# Patient Record
Sex: MALE | Employment: OTHER | URBAN - METROPOLITAN AREA
[De-identification: names, ages, dates, MRNs, and addresses within clinical notes are randomized per-mention and may not be internally consistent; named-entity substitution may affect disease eponyms.]

---

## 2017-03-07 ENCOUNTER — HOSPITAL ENCOUNTER (OUTPATIENT)
Dept: LAB | Age: 82
Discharge: HOME | End: 2017-03-07
Payer: MEDICARE

## 2017-03-07 ENCOUNTER — HOSPITAL ENCOUNTER (OUTPATIENT)
Dept: LAB | Age: 82
Discharge: HOME | End: 2017-03-07

## 2017-03-07 LAB
ANION GAP BLD CALC-SCNC: 7 MMOL/L (ref 3–18)
BASOPHILS # BLD AUTO: 0 K/UL (ref 0–0.06)
BASOPHILS # BLD: 1 % (ref 0–2)
BUN SERPL-MCNC: 14 MG/DL (ref 7–18)
BUN/CREAT SERPL: 19 (ref 12–20)
CALCIUM SERPL-MCNC: 8.4 MG/DL (ref 8.5–10.1)
CHLORIDE SERPL-SCNC: 104 MMOL/L (ref 100–108)
CO2 SERPL-SCNC: 26 MMOL/L (ref 21–32)
CREAT SERPL-MCNC: 0.75 MG/DL (ref 0.6–1.3)
DIFFERENTIAL METHOD BLD: ABNORMAL
EOSINOPHIL # BLD: 0.2 K/UL (ref 0–0.4)
EOSINOPHIL NFR BLD: 5 % (ref 0–5)
ERYTHROCYTE [DISTWIDTH] IN BLOOD BY AUTOMATED COUNT: 12.7 % (ref 11.6–14.5)
GLUCOSE SERPL-MCNC: 100 MG/DL (ref 74–99)
HCT VFR BLD AUTO: 37.1 % (ref 36–48)
HGB BLD-MCNC: 12.6 G/DL (ref 13–16)
LYMPHOCYTES # BLD AUTO: 35 % (ref 21–52)
LYMPHOCYTES # BLD: 1.5 K/UL (ref 0.9–3.6)
MCH RBC QN AUTO: 31 PG (ref 24–34)
MCHC RBC AUTO-ENTMCNC: 34 G/DL (ref 31–37)
MCV RBC AUTO: 91.4 FL (ref 74–97)
MONOCYTES # BLD: 0.7 K/UL (ref 0.05–1.2)
MONOCYTES NFR BLD AUTO: 16 % (ref 3–10)
NEUTS SEG # BLD: 1.8 K/UL (ref 1.8–8)
NEUTS SEG NFR BLD AUTO: 43 % (ref 40–73)
PLATELET # BLD AUTO: 137 K/UL (ref 135–420)
PMV BLD AUTO: 11.3 FL (ref 9.2–11.8)
POTASSIUM SERPL-SCNC: 4.4 MMOL/L (ref 3.5–5.5)
RBC # BLD AUTO: 4.06 M/UL (ref 4.7–5.5)
SODIUM SERPL-SCNC: 137 MMOL/L (ref 136–145)
WBC # BLD AUTO: 4.2 K/UL (ref 4.6–13.2)

## 2017-03-07 PROCEDURE — 85025 COMPLETE CBC W/AUTO DIFF WBC: CPT | Performed by: INTERNAL MEDICINE

## 2017-03-07 PROCEDURE — 80048 BASIC METABOLIC PNL TOTAL CA: CPT | Performed by: INTERNAL MEDICINE

## 2018-01-01 ENCOUNTER — DOCUMENTATION ONLY (OUTPATIENT)
Dept: PALLATIVE CARE | Age: 83
End: 2018-01-01

## 2018-01-01 ENCOUNTER — HOSPITAL ENCOUNTER (EMERGENCY)
Age: 83
Discharge: HOME | End: 2018-12-14
Attending: EMERGENCY MEDICINE | Admitting: EMERGENCY MEDICINE
Payer: MEDICARE

## 2018-01-01 VITALS
SYSTOLIC BLOOD PRESSURE: 134 MMHG | OXYGEN SATURATION: 97 % | HEART RATE: 83 BPM | TEMPERATURE: 98.7 F | RESPIRATION RATE: 16 BRPM | DIASTOLIC BLOOD PRESSURE: 72 MMHG
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
## 2018-01-01 DIAGNOSIS — S60.511A ABRASION OF RIGHT HAND, INITIAL ENCOUNTER: Primary | ICD-10-CM

## 2018-01-01 PROCEDURE — 99281 EMR DPT VST MAYX REQ PHY/QHP: CPT

## 2018-05-07 ENCOUNTER — HOSPITAL ENCOUNTER (OUTPATIENT)
Dept: LAB | Age: 83
Discharge: HOME | End: 2018-05-07
Payer: MEDICARE

## 2018-05-07 LAB
ANION GAP SERPL CALC-SCNC: 6 MMOL/L (ref 3–18)
BUN SERPL-MCNC: 17 MG/DL (ref 7–18)
BUN/CREAT SERPL: 23 (ref 12–20)
CALCIUM SERPL-MCNC: 8.2 MG/DL (ref 8.5–10.1)
CHLORIDE SERPL-SCNC: 108 MMOL/L (ref 100–108)
CO2 SERPL-SCNC: 27 MMOL/L (ref 21–32)
CREAT SERPL-MCNC: 0.74 MG/DL (ref 0.6–1.3)
ERYTHROCYTE [DISTWIDTH] IN BLOOD BY AUTOMATED COUNT: 13.2 % (ref 11.6–14.5)
GLUCOSE SERPL-MCNC: 106 MG/DL (ref 74–99)
HCT VFR BLD AUTO: 37.4 % (ref 36–48)
HGB BLD-MCNC: 12.3 G/DL (ref 13–16)
MCH RBC QN AUTO: 30.2 PG (ref 24–34)
MCHC RBC AUTO-ENTMCNC: 32.9 G/DL (ref 31–37)
MCV RBC AUTO: 91.9 FL (ref 74–97)
PLATELET # BLD AUTO: 157 K/UL (ref 135–420)
PMV BLD AUTO: 10.8 FL (ref 9.2–11.8)
POTASSIUM SERPL-SCNC: 4.4 MMOL/L (ref 3.5–5.5)
RBC # BLD AUTO: 4.07 M/UL (ref 4.7–5.5)
SODIUM SERPL-SCNC: 141 MMOL/L (ref 136–145)
WBC # BLD AUTO: 6.5 K/UL (ref 4.6–13.2)

## 2018-05-07 PROCEDURE — 85027 COMPLETE CBC AUTOMATED: CPT | Performed by: INTERNAL MEDICINE

## 2018-05-07 PROCEDURE — 80048 BASIC METABOLIC PNL TOTAL CA: CPT | Performed by: INTERNAL MEDICINE

## 2018-07-09 NOTE — PROGRESS NOTES
[facility]: [phone] ([phone])  [facility]: [address]: [phone]    Patient Name: Tyrone Nuñez  YOB: 1931    Date of Initial Consult: 7/9/2018   Reason for Consult: care decisions  Requesting Provider: Dr. Karthik Montana  Primary Care Physician: June Villa    SUMMARY:   Tyrone Nuñez is a 80y.o. year old with a past history of Parkinson's disease, dementia, BPH, COPD. He is a long term resident at [facility]. Mr. Brock Patterson is bed bound, at times combative due to dementia requires assistance with all of his ADL's. Palliative medicine is consulted for care decisions. PALLIATIVE DIAGNOSES:   1. Advanced care plan discussions  2. Parkinson's disease   3. Dementia   4. Debility        PLAN:   1. Patient seen this am at the nursing care center. In bed, alerts to his name, will follow some simple commands such as open your mouth, he is able to take his medications without issue. His wife later joined at the bedside. Social  to wife Diaz Brewer, one daughter from previous union who is not actively involved in his life. [employer] who receives [employer] disability. Retired from real estate. Was an avid traveler. Has been living at nursing facility for past 2 years. 2. Advanced care plan discussions due to effects of dementia [place] Nails is not able to participate in medical decision making, when he was able he executed an AMD naming his wife Diaz Brewer as MPOA. Goals of care are already established, and wife re affirmed with us today, DNR/DNI. Mrs. Brock Patterson shares Pet[place]key Nails is her \" heart and soul\" but understands clearly his Parkinson's nor Dementia are curable and in fact very debilitating. He is currently very debilitated but stable. We discussed expectations of medical decline in the future, including dysphagia, aspiration pneumonia, sepsis, dehydration.  She would like to continue same medications for now, does not feel pill burden is to great, and if indicated oral antibiotics are acceptable, but no escalation to the hospital for medical decline, no IVF's no IVAB. If he should fall and hospital eval is warranted this is acceptable. Hospice will be appropriate when medical decline occurs. POST form was signed by wife, indicating DNR/DNI, comfort measures, no feeding tube ever. 3. Symptom management currently appears very comfort, wife agrees, discussed use of opioids, sublingual in the event of dysphagia for pain, dyspnea when needed. Will not add at this time as he appears very comfortable. 4. Debility Bed bound, large gentleman who can be combative at times. Requires feeding by wife and staff, incont of bowel and urine. 5. Initial consult note routed to primary continuity provider  6. Communicated plan of care with: Palliative IDT, wife, nurse, attending    GOALS OF CARE:  Patient/Health Care Proxy Stated Goals: Comfort      TREATMENT PREFERENCES:   Code Status: Prior    Advance Care Planning:  Advance Care Planning 11/30/2016   Patient's Healthcare Decision Maker is: Legal Next of Jen Roberts   Primary Decision Maker Name Apollo Gruber   Primary Decision Maker Phone Number [phone]   Primary Decision Maker Relationship to Patient Spouse   Confirm Advance Directive Yes, not on file   Patient Would Like to Complete Advance Directive No       Medical Interventions: Comfort measures   Other Instructions: continue current medications and treatments, do not hospitalize for medical decline  Artificially Administered Nutrition: No feeding tube     Other:  As far as possible, the palliative care team has discussed with patient / health care proxy about goals of care / treatment preferences for patient.      HISTORY:     History obtained from: chart and wife    CHIEF COMPLAINT: dementia and debility     HPI/SUBJECTIVE:    The patient is:   [] Verbal and participatory  [x] Non-participatory due to:   80year old male who is a long term resident at [facility] due to severe functional debility related to dementia and Parkinson's. Currently, bed bound requires assistance with all ADL's, he is able to take and tolerate oral medications. Care decisions discussed with his wife, comfort measures, POST signed. Clinical Pain Assessment (nonverbal scale for nonverbal patients): Clinical Pain Assessment  Severity: 0     Activity (Movement): Lying quietly, normal position    Duration: for how long has pt been experiencing pain (e.g., 2 days, 1 month, years)  Frequency: how often pain is an issue (e.g., several times per day, once every few days, constant)     FUNCTIONAL ASSESSMENT:     Palliative Performance Scale (PPS):  PPS: 30    ECOG  ECOG Status : Completely disabled     PSYCHOSOCIAL/SPIRITUAL SCREENING:      Any spiritual / Quaker concerns: unable to assess for patient, no for wife  [] Yes /  [] No    Caregiver Burnout:  [] Yes /  [x] No /  [] No Caregiver Present      Anticipatory grief assessment:  Unable to assess for patient, no for wife   [] Normal  / [] Maladaptive        REVIEW OF SYSTEMS:     Positive and pertinent negative findings in ROS are noted above in HPI. The following systems were [] reviewed / [x] unable to be reviewed as noted in HPI  Other findings are noted below. Systems: constitutional, ears/nose/mouth/throat, respiratory, gastrointestinal, genitourinary, musculoskeletal, integumentary, neurologic, psychiatric, endocrine. Positive findings noted below. Modified ESAS Completed by: provider   Fatigue: 8 Drowsiness: 4     Pain: 0           Dyspnea: 0     Constipation: No              PHYSICAL EXAM:     Wt Readings from Last 3 Encounters:   12/01/16 85.3 kg (188 lb 1.6 oz)     There were no vitals taken for this visit.   Pain:                        Constitutional: elderly large male who is chronically ill, lying in bed in NAD  ENMT: no nasal discharge, moist mucous membranes  Respiratory: breathing not labored  Skin: warm, dry  Neurologic: opened his eyes to his name, calm followed simple command to open mouth. Did not engage in conversation or answer other questions. HISTORY:     Active Problems:    * No active hospital problems. *    Past Medical History:   Diagnosis Date    Bowel obstruction ([facility]     Cancer (HCC)     colon    Chronic obstructive pulmonary disease ([facility]     Dementia     Hyperlipidemia     Hypertension     Muscle weakness     [facility]       Past Surgical History:   Procedure Laterality Date    HX GI      colon cancer    HX HEENT      left eye cataract      No family history on file. History reviewed, no pertinent family history. Social History   Substance Use Topics    Smoking status: Unknown If Ever Smoked    Smokeless tobacco: Not on file    Alcohol use Not on file     No Known Allergies        LAB AND IMAGING FINDINGS:     Lab Results   Component Value Date/Time    WBC 6.5 05/07/2018 03:00 PM    HGB 12.3 (L) 05/07/2018 03:00 PM    PLATELET 379 28/75/9815 03:00 PM     Lab Results   Component Value Date/Time    Sodium 141 05/07/2018 03:00 PM    Potassium 4.4 05/07/2018 03:00 PM    Chloride 108 05/07/2018 03:00 PM    CO2 27 05/07/2018 03:00 PM    BUN 17 05/07/2018 03:00 PM    Creatinine 0.74 05/07/2018 03:00 PM    Calcium 8.2 (L) 05/07/2018 03:00 PM      Lab Results   Component Value Date/Time    AST (SGOT) 13 (L) 12/07/2016 08:00 AM    Alk.  phosphatase 67 12/07/2016 08:00 AM    Protein, total 6.0 (L) 12/07/2016 08:00 AM    Albumin 2.7 (L) 12/07/2016 08:00 AM    Globulin 3.3 12/07/2016 08:00 AM     No results found for: INR, PTMR, PTP, PT1, PT2, APTT   No results found for: IRON, FE, TIBC, IBCT, PSAT, FERR   No results found for: PH, PCO2, PO2  No components found for: [place]   Lab Results   Component Value Date/Time     11/30/2016 12:40 PM    CK - MB 2.6 11/30/2016 12:40 PM              Total time: 70 minutes   Counseling / coordination time, spent as noted above: 50 minutes    > 50% counseling / coordination: yes with wife     Prolonged service was provided for  []30 min   []75 min in face to face time in the presence of the patient, spent as noted above. Time Start:   Time End:   Note: this can only be billed with 92634 (initial) or 43033 (follow up). If multiple start / stop times, list each separately.

## 2018-08-02 NOTE — PROGRESS NOTES
[facility]: [phone]  [facility]: [address]: [phone]    Patient Name: Linda Alexandre  YOB: 1931    Date of Initial Consult: 7/9/2018   Reason for Consult: care decisions  Requesting Provider: Dr. Elodia Storm  Primary Care Physician: Aida Crews    SUMMARY:   Linda Alexandre is a 80y.o. year old with a past history of Parkinson's disease, dementia, BPH, COPD. He is a long term resident at [facility]. Mr. Opal Raymond is bed bound, at times combative due to dementia requires assistance with all of his ADL's. Palliative medicine is consulted for care decisions. PALLIATIVE DIAGNOSES:   1. Advanced care plan discussions  2. Parkinson's disease   3. Dementia   4. Debility        PLAN:   1. 8/2/2018 Mr. Opal Raymond seen as follow today. His wife was at bedside. She was feeding him breakfast. Drinking OJ, Told us hello but really did not participate in conversation. Wife tells us yesterday there was a period of time he was very difficult to arouse but once aroused seemed back to baseline. Reinforced with wife progression of dementia and likely will wax and wane. There are no changes to goals of care or care decisions. DNr/DNI comfort measures do not escalate to hospital POST on file. 2. Advanced care plan discussions due to effects of dementia Chintan Marrero is not able to participate in medical decision making, when he was able he executed an AMD naming his wife Jose Miguel Villa as MPOA. Goals of care are already established, and wife re affirmed with us today, DNR/DNI. Mrs. Opal Raymond shares Chintan Marrero is her \" heart and soul\" but understands clearly his Parkinson's nor Dementia are curable and in fact very debilitating. He is currently very debilitated but stable. We discussed expectations of medical decline in the future, including dysphagia, aspiration pneumonia, sepsis, dehydration.  She would like to continue same medications for now, does not feel pill burden is to great, and if indicated oral antibiotics are acceptable, but no escalation to the hospital for medical decline, no IVF's no IVAB. If he should fall and hospital eval is warranted this is acceptable. Hospice will be appropriate when medical decline occurs. POST form was signed by wife, indicating DNR/DNI, comfort measures, no feeding tube ever. 3. Symptom management comfortable appearing today tolerating breakfast well. 4. Debility Bed bound, large gentleman who can be combative at times. Requires feeding by wife and staff, incont of bowel and urine. 5. Initial consult note routed to primary continuity provider  6. Communicated plan of care with: Palliative IDT, wife    GOALS OF CARE:  Patient/Health Care Proxy Stated Goals: Comfort      TREATMENT PREFERENCES:   Code Status: Prior    Advance Care Planning:  Advance Care Planning 11/30/2016   Patient's Healthcare Decision Maker is: Legal Next of Jen 69   Primary Decision Maker Name Angus Galaviz   Primary Decision Maker Phone Number [phone]   Primary Decision Maker Relationship to Patient Spouse   Confirm Advance Directive Yes, not on file   Patient Would Like to Complete Advance Directive No       Medical Interventions: Comfort measures   Other Instructions: continue current medications and treatments, do not hospitalize for medical decline  Artificially Administered Nutrition: No feeding tube     Other:  As far as possible, the palliative care team has discussed with patient / health care proxy about goals of care / treatment preferences for patient.      HISTORY:     History obtained from: chart and wife    CHIEF COMPLAINT: dementia and debility     HPI/SUBJECTIVE:    The patient is:   [] Verbal and participatory  [x] Non-participatory due to:   80year old male who is a long term resident at [facility] due to severe functional debility related to dementia and Parkinson's. Currently, bed bound requires assistance with all ADL's, he is able to take and tolerate oral medications. Care decisions discussed with his wife, comfort measures, POST signed. Clinical Pain Assessment (nonverbal scale for nonverbal patients): Clinical Pain Assessment  Severity: 0     Activity (Movement): Lying quietly, normal position    Duration: for how long has pt been experiencing pain (e.g., 2 days, 1 month, years)  Frequency: how often pain is an issue (e.g., several times per day, once every few days, constant)     FUNCTIONAL ASSESSMENT:     Palliative Performance Scale (PPS):  PPS: 30    ECOG  ECOG Status : Completely disabled     PSYCHOSOCIAL/SPIRITUAL SCREENING:      Any spiritual / Advent concerns: unable to assess for patient, no for wife  [] Yes /  [] No    Caregiver Burnout:  [] Yes /  [x] No /  [] No Caregiver Present      Anticipatory grief assessment:  Unable to assess for patient, no for wife   [] Normal  / [] Maladaptive        REVIEW OF SYSTEMS:     Positive and pertinent negative findings in ROS are noted above in HPI. The following systems were [] reviewed / [x] unable to be reviewed as noted in HPI  Other findings are noted below. Systems: constitutional, ears/nose/mouth/throat, respiratory, gastrointestinal, genitourinary, musculoskeletal, integumentary, neurologic, psychiatric, endocrine. Positive findings noted below. Modified ESAS Completed by: provider           Pain: 0           Dyspnea: 0                    PHYSICAL EXAM:     Wt Readings from Last 3 Encounters:   12/01/16 85.3 kg (188 lb 1.6 oz)     There were no vitals taken for this visit.   Pain:                        Constitutional: chronically ill gentleman in bed sitting up with wife feeding him in nad   ENMT: no nasal discharge, moist mucous membranes  Respiratory: breathing not labored  Skin: warm, dry  Neurologic: alert drinking OJ says yes to some questions but does not follow or participate in conversation. HISTORY:     Active Problems:    * No active hospital problems. *    Past Medical History:   Diagnosis Date    [facility] ([facility]     Cancer (HCC)     colon    Chronic [facility] pulmonary disease ([facility]     Dementia     Hyperlipidemia     Hypertension     Muscle weakness     Parkinson's disease ([facility]       Past Surgical History:   Procedure Laterality Date    HX GI      colon cancer    HX HEENT      left eye cataract      No family history on file. History reviewed, no pertinent family history. Social History   Substance Use Topics    Smoking status: Unknown If Ever Smoked    Smokeless tobacco: Not on file    Alcohol use Not on file     No Known Allergies        LAB AND IMAGING FINDINGS:     Lab Results   Component Value Date/Time    WBC 6.5 05/07/2018 03:00 PM    HGB 12.3 (L) 05/07/2018 03:00 PM    PLATELET 679 64/19/6890 03:00 PM     Lab Results   Component Value Date/Time    Sodium 141 05/07/2018 03:00 PM    Potassium 4.4 05/07/2018 03:00 PM    Chloride 108 05/07/2018 03:00 PM    CO2 27 05/07/2018 03:00 PM    BUN 17 05/07/2018 03:00 PM    Creatinine 0.74 05/07/2018 03:00 PM    Calcium 8.2 (L) 05/07/2018 03:00 PM      Lab Results   Component Value Date/Time    AST (SGOT) 13 (L) 12/07/2016 08:00 AM    Alk.  phosphatase 67 12/07/2016 08:00 AM    Protein, total 6.0 (L) 12/07/2016 08:00 AM    Albumin 2.7 (L) 12/07/2016 08:00 AM    Globulin 3.3 12/07/2016 08:00 AM     No results found for: INR, PTMR, PTP, PT1, PT2, APTT   No results found for: IRON, FE, TIBC, IBCT, PSAT, FERR   No results found for: PH, PCO2, PO2  No components found for: [place]   Lab Results   Component Value Date/Time     11/30/2016 12:40 PM    CK - MB 2.6 11/30/2016 12:40 PM              Total time: 15 minutes   Counseling / coordination time, spent as noted above: 10 minutes    > 50% counseling / coordination: yes with wife     Prolonged service was provided for  []30 min   []75 min in face to face time in the presence of the patient, spent as noted above. Time Start:   Time End:   Note: this can only be billed with 66566 (initial) or 74382 (follow up). If multiple start / stop times, list each separately.

## 2018-08-23 NOTE — PROGRESS NOTES
[facility]: [phone] ([phone])  [facility]: [address] [place] [address] Way: [phone]    Patient Name: Arnoldo Sharp  YOB: 1931    Date of Follow up 8/23/2018   Reason for Consult: care decisions  Requesting Provider: Dr. Nallely Rosales  Primary Care Physician: Bea Dickerson    SUMMARY:   Arnoldo Sharp is a 80y.o. year old with a past history of Parkinson's disease, dementia, BPH, COPD. He is a long term resident at [facility]. Mr. Bell Tran is bed bound, at times combative due to dementia requires assistance with all of his ADL's. Palliative medicine is consulted for care decisions. PALLIATIVE DIAGNOSES:   1. Advanced care plan discussions  2. Parkinson's disease   3. Dementia   4. Debility        PLAN:   1. 8/23/2018 Follow up today with Mr. Bell Tran. Wife feels he is declining, not recognizing her as much. She clearly understands his dementia is progressive and terminal but is saddened by the progression. Mr. Bell Tran was easily aroused from sleep. He did not seem to know his wife upon awakening. Did say good morning, told me he did not have any pain and looked very comfortable. He did not offer any additional conversation. Maybe slowly  Declining, but still retains some appetite, no recent infections. Will hold on hospice referral as he seems stable. Support for Mrs. Bell Tran as journey is long and difficult. Please note below conversation on our initial eval.   There have been no changes in goals of care DNR/DNI with comfort measures no feeding tube. Continue same medications and treatments at current time. 2. Advanced care plan discussions due to effects of dementia Marquis Wilson is not able to participate in medical decision making, when he was able he executed an AMD naming his wife Carmen Saenz as MPOA. Goals of care are already established, and wife re affirmed with us today, DNR/DNI. Mrs. Bell Tran shares Olivia Mcdonnell is her \" heart and soul\" but understands clearly his Parkinson's nor Dementia are curable and in fact very debilitating. He is currently very debilitated but stable. We discussed expectations of medical decline in the future, including dysphagia, aspiration pneumonia, sepsis, dehydration. She would like to continue same medications for now, does not feel pill burden is to great, and if indicated oral antibiotics are acceptable, but no escalation to the hospital for medical decline, no IVF's no IVAB. If he should fall and hospital eval is warranted this is acceptable. Hospice will be appropriate when medical decline occurs. POST form was signed by wife, indicating DNR/DNI, comfort measures, no feeding tube ever. 3. Symptom management comfortable appearing today tolerating breakfast well. 4. Debility Bed bound, large gentleman who can be combative at times. Requires feeding by wife and staff, incont of bowel and urine. 5. Initial consult note routed to primary continuity provider  6. Communicated plan of care with: Palliative IDT, wife    GOALS OF CARE:         TREATMENT PREFERENCES:   Code Status: DNr DNI     Comfort measures / no feeding tubes/ continue same medications    Advance Care Planning:  Advance Care Planning 11/30/2016   Patient's Healthcare Decision Maker is: Legal Next of Jen 69   Primary Decision Maker Name Apollo Gruber   Primary Decision Maker Phone Number [phone]   Primary Decision Maker Relationship to Patient Spouse   Confirm Advance Directive Yes, not on file   Patient Would Like to Complete Advance Directive No           Other Instructions: continue current medications and treatments, do not hospitalize for medical decline        Other:  As far as possible, the palliative care team has discussed with patient / health care proxy about goals of care / treatment preferences for patient.      HISTORY:     History obtained from: chart and wife    CHIEF COMPLAINT: dementia and debility HPI/SUBJECTIVE:    The patient is:   [] Verbal and participatory  [x] Non-participatory due to:   8/23/2018 lying in bed arouses easily, confused which is baseline with his dementia. Responds verbally to limited questions but does not participate in conversation comfortable appearing. Wife at bedside. Remains bed fast and requires assistance with all ADL's.de  80year old male who [place] [facility] to severe functional debility related to dementia and Parkinson's. Currently, bed bound requires assistance with all ADL's, he is able to take and tolerate oral medications. Care decisions discussed with his wife, comfort measures, POST signed. Clinical Pain Assessment (nonverbal scale for nonverbal patients):        denies pain, face serene no indicators of pain     Duration: for how long has pt been experiencing pain (e.g., 2 days, 1 month, years)  Frequency: how often pain is an issue (e.g., several times per day, once every few days, constant)     FUNCTIONAL ASSESSMENT:     Palliative Performance Scale (PPS): 20       ECOG        PSYCHOSOCIAL/SPIRITUAL SCREENING:      Any spiritual / Christianity concerns: unable to assess for patient, no for wife  [] Yes /  [] No    Caregiver Burnout:  [] Yes /  [x] No /  [] No Caregiver Present      Anticipatory grief assessment:  Unable to assess for patient, no for wife   [] Normal  / [] Maladaptive        REVIEW OF SYSTEMS:     Positive and pertinent negative findings in ROS are noted above in HPI. The following systems were [] reviewed / [x] unable to be reviewed as noted in HPI  Other findings are noted below. Systems: constitutional, ears/nose/mouth/throat, respiratory, gastrointestinal, genitourinary, musculoskeletal, integumentary, neurologic, psychiatric, endocrine. Positive findings noted below.   Modified ESAS Completed by: provider    pain 0       dyspnea 0                                   PHYSICAL EXAM:     Wt Readings from Last 3 Encounters:   12/01/16 85.3 kg (188 lb 1.6 oz)     There were no vitals taken for this visit. Pain: no reports of pain                         Constitutional: chronically ill and debilitated gentleman lying in bed alerts to his name, minimally verbal in NAD  ENMT: no nasal discharge, moist mucous membranes  Respiratory: breathing not labored  Skin: warm, dry  Neurologic: alert oriented to self, answers simple questions, does not participate in conversation        HISTORY:     Active Problems:    * No active hospital problems. *    Past Medical History:   Diagnosis Date    [facility]     Cancer (HCC)     colon    Chronic obstructive [facility] disease ([facility]     Dementia     Hyperlipidemia     Hypertension     Muscle weakness     Parkinson's disease ([facility]       Past Surgical History:   Procedure Laterality Date    HX GI      colon cancer    HX HEENT      left eye cataract      No family history on file. History reviewed, no pertinent family history. Social History   Substance Use Topics    Smoking status: Unknown If Ever Smoked    Smokeless tobacco: Not on file    Alcohol use Not on file     No Known Allergies        LAB AND IMAGING FINDINGS:     Lab Results   Component Value Date/Time    WBC 6.5 05/07/2018 03:00 PM    HGB 12.3 (L) 05/07/2018 03:00 PM    PLATELET 131 79/53/3311 03:00 PM     Lab Results   Component Value Date/Time    Sodium 141 05/07/2018 03:00 PM    Potassium 4.4 05/07/2018 03:00 PM    Chloride 108 05/07/2018 03:00 PM    CO2 27 05/07/2018 03:00 PM    BUN 17 05/07/2018 03:00 PM    Creatinine 0.74 05/07/2018 03:00 PM    Calcium 8.2 (L) 05/07/2018 03:00 PM      Lab Results   Component Value Date/Time    AST (SGOT) 13 (L) 12/07/2016 08:00 AM    Alk.  phosphatase 67 12/07/2016 08:00 AM    Protein, total 6.0 (L) 12/07/2016 08:00 AM    Albumin 2.7 (L) 12/07/2016 08:00 AM    Globulin 3.3 12/07/2016 08:00 AM     No results found for: INR, PTMR, PTP, PT1, PT2, APTT   No results found for: IRON, FE, TIBC, IBCT, PSAT, FERR   No results found for: PH, PCO2, PO2  No components found for: [place]   Lab Results   Component Value Date/Time     11/30/2016 12:40 PM    CK - MB 2.6 11/30/2016 12:40 PM              Total time: 25 minutes   Counseling / coordination time, spent as noted above: 15 minutes    > 50% counseling / coordination: yes with wife     Prolonged service was provided for  []30 min   []75 min in face to face time in the presence of the patient, spent as noted above. Time Start:   Time End:   Note: this can only be billed with 47927 (initial) or 19575 (follow up). If multiple start / stop times, list each separately.

## 2018-08-29 NOTE — PROGRESS NOTES
[facility]: [phone] ([phone])  [facility]: [address]: [phone]    Patient Name: Sande Sandifer  YOB: 1931    Date of Follow up 8/29/2018   Reason for Consult: care decisions  Requesting Provider: Dr. Faviola Garland  Primary Care Physician: Jere Nair    SUMMARY:   Sande Sandifer is a 80y.o. year old with a past history of Parkinson's disease, dementia, BPH, COPD. He is a long term resident at [facility]. Mr. Eva Herrera is bed bound, at times combative due to dementia requires assistance with all of his ADL's. Palliative medicine is consulted for care decisions. PALLIATIVE DIAGNOSES:   1. Advanced care plan discussions  2. Parkinson's disease   3. Dementia   4. Debility        PLAN:   1. 8/29/18: Mr. Eva Herrera was seen for follow up today along with Heidilen , NP. He was awake and feeding himself breakfast.  Coughing and wet quality noted to voice after swallow. When asked how he is doing he replies \"fine. \" He did allow us to reposition hm. He says a few words but cannot hold a meaningful conversation. He does not appear in any distress. We attended his care plan meeting yesterday and his wife did not have any specific concerns. She has a good understanding of dementia and understands that he is declining. Goals of care are unchanged:  DNR/DNI with comfort measures no feeding tube. Continue same medications and treatments at current time. We will contiinue to follow for family support and symptom management as needed. 2. 8/23/2018 Follow up today with Mr. Eva Herrera. Wife feels he is declining, not recognizing her as much. She clearly understands his dementia is progressive and terminal but is saddened by the progression. Mr. Eva Herrera was easily aroused from sleep. He did not seem to know his wife upon awakening.  Did say good morning, told me he did not have any pain and looked very comfortable. He did not offer any additional conversation. Maybe slowly  Declining, but still retains some appetite, no recent infections. Will hold on hospice referral as he seems stable. Support for Mrs. Sekou Brunner as journey is long and difficult. Please note below conversation on our initial eval.   There have been no changes in goals of care DNR/DNI with comfort measures no feeding tube. Continue same medications and treatments at current time. 3. Advanced care plan discussions due to effects of dementia Talat Bruno is not able to participate in medical decision making, when he was able he executed an AMD naming his wife Monae Clemens as MPOA. Goals of care are already established, and wife re affirmed with us today, DNR/DNI. Mrs. Sekou Brunner shares Talat Bruno is her \" heart and soul\" but understands clearly his Parkinson's nor Dementia are curable and in fact very debilitating. He is currently very debilitated but stable. We discussed expectations of medical decline in the future, including dysphagia, aspiration pneumonia, sepsis, dehydration. She would like to continue same medications for now, does not feel pill burden is to great, and if indicated oral antibiotics are acceptable, but no escalation to the hospital for medical decline, no IVF's no IVAB. If he should fall and hospital eval is warranted this is acceptable. Hospice will be appropriate when medical decline occurs. POST form was signed by wife, indicating DNR/DNI, comfort measures, no feeding tube ever. 4. Symptom management comfortable appearing today tolerating breakfast well. 5. Debility Bed bound, large gentleman who can be combative at times. Requires feeding by wife and staff, incont of bowel and urine. 6. Initial consult note routed to primary continuity provider  7.  Communicated plan of care with: Palliative IDT, wife    GOALS OF CARE:  Patient/Health Care Proxy Stated Goals: Comfort      TREATMENT PREFERENCES:   Code Status: DNR DNI     Comfort measures / no feeding tubes/ continue same medications    Advance Care Planning:  Advance Care Planning 11/30/2016   Patient's Healthcare Decision Maker is: Legal Next of Jen Roberts   Primary Decision Maker Name Willard Ceron   Primary Decision Maker Phone Number [phone]   Primary Decision Maker Relationship to Patient Spouse   Confirm Advance Directive Yes, not on file   Patient Would Like to Complete Advance Directive No       Medical Interventions: Comfort measures   Other Instructions: continue current medications and treatments, do not hospitalize for medical decline  Artificially Administered Nutrition: No feeding tube     Other:  As far as possible, the palliative care team has discussed with patient / health care proxy about goals of care / treatment preferences for patient. HISTORY:     History obtained from: chart and wife    CHIEF COMPLAINT: dementia and debility     HPI/SUBJECTIVE:    The patient is:   [] Verbal and participatory  [x] Non-participatory due to:    8/29/18: in bed feeding himself breakfast, spilling some food on shirt. Coughing and wet voice quality noted after swallow. Few word responses to some simple questions, cannot hold a conversation. 8/23/2018 lying in bed arouses easily, confused which is baseline with his dementia. Responds verbally to limited questions but does not participate in conversation comfortable appearing. Wife at bedside. Remains bed fast and requires assistance with all ADL's.  80year old male who is a long term resident at [facility] due to severe functional debility related to dementia and Parkinson's. Currently, bed bound requires assistance with all ADL's, he is able to take and tolerate oral medications. Care decisions discussed with his wife, comfort measures, POST signed.      Clinical Pain Assessment (nonverbal scale for nonverbal patients): Clinical Pain Assessment  Severity: 0     Activity (Movement): Lying quietly, normal positiondenies pain, face serene no indicators of pain     Duration: for how long has pt been experiencing pain (e.g., 2 days, 1 month, years)  Frequency: how often pain is an issue (e.g., several times per day, once every few days, constant)     FUNCTIONAL ASSESSMENT:     Palliative Performance Scale (PPS):   PPS: 30    ECOG        PSYCHOSOCIAL/SPIRITUAL SCREENING:      Any spiritual / Hoahaoism concerns: unable to assess for patient, no for wife  [] Yes /  [] No    Caregiver Burnout:  [] Yes /  [x] No /  [] No Caregiver Present      Anticipatory grief assessment:  Unable to assess for patient, no for wife   [] Normal  / [] Maladaptive        REVIEW OF SYSTEMS:     Positive and pertinent negative findings in ROS are noted above in HPI. The following systems were [] reviewed / [x] unable to be reviewed as noted in HPI  Other findings are noted below. Systems: constitutional, ears/nose/mouth/throat, respiratory, gastrointestinal, genitourinary, musculoskeletal, integumentary, neurologic, psychiatric, endocrine. Positive findings noted below.   Modified ESAS Completed by: provider         Pain: 0         Anorexia: 0 Dyspnea: 0                    PHYSICAL EXAM:     Wt Readings from Last 3 Encounters:   12/01/16 85.3 kg (188 lb 1.6 oz)       Constitutional: chronically ill and debilitated gentleman lying in bed alerts to his name, minimally verbal in NAD  ENMT: no nasal discharge, moist mucous membranes  Respiratory: breathing not labored  Skin: warm, dry  Neurologic: alert oriented to self, answers simple questions, does not participate in conversation        HISTORY:       Past Medical History:   Diagnosis Date    [facility] ([facility]     Cancer ([facility]     colon    [facility]     Dementia     Hyperlipidemia     Hypertension     Muscle weakness     Parkinson's disease ([facility]       Past Surgical History:   Procedure Laterality Date    HX GI      colon cancer    HX HEENT left eye cataract      No family history on file. History reviewed, no pertinent family history. Social History   Substance Use Topics    Smoking status: Unknown If Ever Smoked    Smokeless tobacco: Not on file    Alcohol use Not on file     No Known Allergies        LAB AND IMAGING FINDINGS:     Lab Results   Component Value Date/Time    WBC 6.5 05/07/2018 03:00 PM    HGB 12.3 (L) 05/07/2018 03:00 PM    PLATELET 444 49/17/3518 03:00 PM     Lab Results   Component Value Date/Time    Sodium 141 05/07/2018 03:00 PM    Potassium 4.4 05/07/2018 03:00 PM    Chloride 108 05/07/2018 03:00 PM    CO2 27 05/07/2018 03:00 PM    BUN 17 05/07/2018 03:00 PM    Creatinine 0.74 05/07/2018 03:00 PM    Calcium 8.2 (L) 05/07/2018 03:00 PM      Lab Results   Component Value Date/Time    AST (SGOT) 13 (L) 12/07/2016 08:00 AM    Alk. phosphatase 67 12/07/2016 08:00 AM    Protein, total 6.0 (L) 12/07/2016 08:00 AM    Albumin 2.7 (L) 12/07/2016 08:00 AM    Globulin 3.3 12/07/2016 08:00 AM     No results found for: INR, PTMR, PTP, PT1, PT2, APTT   No results found for: IRON, FE, TIBC, IBCT, PSAT, FERR   No results found for: PH, PCO2, PO2  No components found for: [place]   Lab Results   Component Value Date/Time     11/30/2016 12:40 PM    CK - MB 2.6 11/30/2016 12:40 PM              Total time: 15 minutes   Counseling / coordination time, spent as noted above: 10 minutes    > 50% counseling / coordination: yes with wife     Prolonged service was provided for  []30 min   []75 min in face to face time in the presence of the patient, spent as noted above. Time Start:   Time End:   Note: this can only be billed with 45610 (initial) or 52896 (follow up). If multiple start / stop times, list each separately.

## 2018-09-25 NOTE — PROGRESS NOTES
[facility] [place]: [phone]  formerly [facility]: [address] [place] [address] Way: [phone]    Patient Name: Wendy Egan  YOB: 1931    Date of Follow up 9/25/2018  Reason for Consult: care decisions  Requesting Provider: Dr. Fred Lyle  Primary Care Physician: Asad Abraham    SUMMARY:   Wendy Egan is a 80y.o. year old with a past history of Parkinson's disease, dementia, BPH, COPD. He is a long term resident at [facility]. Mr. Rafael Pham is bed bound, at times combative due to dementia requires assistance with all of his ADL's. Palliative medicine is consulted for care decisions. 9/25/2018 No change in over all condition today. Remains bed bound, sipping on milk and tolerating well. Answers simple questions. Wife at bedside no concerns. PALLIATIVE DIAGNOSES:   1. Advanced care plan discussions  2. Parkinson's disease   3. Dementia   4. Debility        PLAN:   1. 9/25/2018 seen as foollow up today along with , Gisela Snider NP. In bed alert, confused, but can answer simple questions. Denies pain. Wife feels he is comfortable. + Bowel movements. Appetite good without signs of dsyphaga per wife. No change in [address], DNR/DNI with comfort measures, continuing curtrent medications. Support for wife, in this chronically ill and debilitated gentleman. 2. 8/29/18: Mr. Rafael Pham was seen for follow up today along with Raheem Pickard, NP. He was awake and feeding himself breakfast.  Coughing and wet quality noted to voice after swallow. When asked how he is doing he replies \"fine. \" He did allow us to reposition hm. He says a few words but cannot hold a meaningful conversation. He does not appear in any distress. We attended his care plan meeting yesterday and his wife did not have any specific concerns. She has a good understanding of dementia and understands that he is declining.  Goals of care are unchanged: DNR/DNI with comfort measures no feeding tube. Continue same medications and treatments at current time. We will contiinue to follow for family support and symptom management as needed. 3. 8/23/2018 Follow up today with Mr. Nancy Smith. Wife feels he is declining, not recognizing her as much. She clearly understands his dementia is progressive and terminal but is saddened by the progression. Mr. Nancy Smith was easily aroused from sleep. He did not seem to know his wife upon awakening. Did say good morning, told me he did not have any pain and looked very comfortable. He did not offer any additional conversation. Maybe slowly  Declining, but still retains some appetite, no recent infections. Will hold on hospice referral as he seems stable. Support for Mrs. Nancy Smith as journey is long and difficult. Please note below conversation on our initial eval.   There have been no changes in goals of care DNR/DNI with comfort measures no feeding tube. Continue same medications and treatments at current time. 4. Advanced care plan discussions due to effects of dementia Kayla Walton is not able to participate in medical decision making, when he was able he executed an AMD naming his wife Guru Pearson as MPOA. Goals of care are already established, and wife re affirmed with us today, DNR/DNI. Mrs. Nancy Smith shares Kayla Walton is her \" heart and soul\" but understands clearly his Parkinson's nor Dementia are curable and in fact very debilitating. He is currently very debilitated but stable. We discussed expectations of medical decline in the future, including dysphagia, aspiration pneumonia, sepsis, dehydration. She would like to continue same medications for now, does not feel pill burden is to great, and if indicated oral antibiotics are acceptable, but no escalation to the hospital for medical decline, no IVF's no IVAB. If he should fall and hospital eval is warranted this is acceptable.  Hospice will be appropriate when medical decline occurs. POST form was signed by wife, indicating DNR/DNI, comfort measures, no feeding tube ever. 5. Symptom management comfortable appearing today tolerating breakfast well. 6. Debility Bed bound, large gentleman who can be combative at times. Requires feeding by wife and staff, incont of bowel and urine. 7. Initial consult note routed to primary continuity provider  8. Communicated plan of care with: Palliative IDT, wife    GOALS OF CARE:  Patient/Health Care Proxy Stated Goals: Comfort      TREATMENT PREFERENCES:   Code Status: DNR DNI     Comfort measures / no feeding tubes/ continue same medications    Advance Care Planning:  Advance Care Planning 11/30/2016   Patient's Healthcare Decision Maker is: Legal Next of Jen Roberts   Primary Decision Maker Name Crystal Mckinnon   Primary Decision Maker Phone Number [phone]   Primary Decision Maker Relationship to Patient Spouse   Confirm Advance Directive Yes, not on file   Patient Would Like to Complete Advance Directive No       Medical Interventions: Comfort measures   Other Instructions: continue current medications and treatments, do not hospitalize for medical decline  Artificially Administered Nutrition: No feeding tube     Other:  As far as possible, the palliative care team has discussed with patient / health care proxy about goals of care / treatment preferences for patient. HISTORY:     History obtained from: chart and wife    CHIEF COMPLAINT: dementia and debility     HPI/SUBJECTIVE:    The patient is:   [] Verbal and participatory  [x] Non-participatory due to:  9/25/2018  In bed alert confused, sipping on milk. No coughing noted this  Am with fluid intake. No complaints pain answers a few questions. 8/29/18: in bed feeding himself breakfast, spilling some food on shirt. Coughing and wet voice quality noted after swallow. Few word responses to some simple questions, cannot hold a conversation.    8/23/2018 lying in bed arouses easily, confused which is baseline with his dementia. Responds verbally to limited questions but does not participate in conversation comfortable appearing. Wife at bedside. Remains bed fast and requires assistance with all ADL's.  80year old male who is a long term resident at [facility] due to severe functional debility related to dementia and Parkinson's. Currently, bed bound requires assistance with all ADL's, he is able to take and tolerate oral medications. Care decisions discussed with his wife, comfort measures, POST signed. Clinical Pain Assessment (nonverbal scale for nonverbal patients): Clinical Pain Assessment  Severity: 0      denies pain, face serene no indicators of pain     Duration: for how long has pt been experiencing pain (e.g., 2 days, 1 month, years)  Frequency: how often pain is an issue (e.g., several times per day, once every few days, constant)     FUNCTIONAL ASSESSMENT:     Palliative Performance Scale (PPS):        ECOG  ECOG Status : Completely disabled     PSYCHOSOCIAL/SPIRITUAL SCREENING:      Any spiritual / Voodoo concerns: unable to assess for patient, no for wife  [] Yes /  [] No    Caregiver Burnout:  [] Yes /  [x] No /  [] No Caregiver Present      Anticipatory grief assessment:  Unable to assess for patient, no for wife   [] Normal  / [] Maladaptive        REVIEW OF SYSTEMS:     Positive and pertinent negative findings in ROS are noted above in HPI. The following systems were [] reviewed / [x] unable to be reviewed as noted in HPI  Other findings are noted below. Systems: constitutional, ears/nose/mouth/throat, respiratory, gastrointestinal, genitourinary, musculoskeletal, integumentary, neurologic, psychiatric, endocrine. Positive findings noted below.   Modified ESAS Completed by: provider         Pain: 0         Anorexia: 3 Dyspnea: 0     Constipation: No              PHYSICAL EXAM:     Wt Readings from Last 3 Encounters:   12/01/16 85.3 kg (188 lb 1.6 oz) Constitutional: elderly, debilitated gentleman who is lying in bed alert oriented to himself in NAD  ENMT: no nasal discharge, moist mucous membranes  Respiratory: breathing not labored  Skin: warm, dry  Neurologic: alert oriented to self, answers simple questions, does not participate in conversation        HISTORY:       Past Medical History:   Diagnosis Date    [facility]     [facility]     colon    Chronic [facility] pulmonary disease [facility]     Dementia     Hyperlipidemia     Hypertension     Muscle weakness     [facility]       Past Surgical History:   Procedure Laterality Date    HX GI      colon cancer    HX HEENT      left eye cataract      No family history on file. History reviewed, no pertinent family history. Social History   Substance Use Topics    Smoking status: Unknown If Ever Smoked    Smokeless tobacco: Not on file    Alcohol use Not on file     No Known Allergies        LAB AND IMAGING FINDINGS:     Lab Results   Component Value Date/Time    WBC 6.5 05/07/2018 03:00 PM    HGB 12.3 (L) 05/07/2018 03:00 PM    PLATELET 402 38/65/9256 03:00 PM     Lab Results   Component Value Date/Time    Sodium 141 05/07/2018 03:00 PM    Potassium 4.4 05/07/2018 03:00 PM    Chloride 108 05/07/2018 03:00 PM    CO2 27 05/07/2018 03:00 PM    BUN 17 05/07/2018 03:00 PM    Creatinine 0.74 05/07/2018 03:00 PM    Calcium 8.2 (L) 05/07/2018 03:00 PM      Lab Results   Component Value Date/Time    AST (SGOT) 13 (L) 12/07/2016 08:00 AM    Alk.  phosphatase 67 12/07/2016 08:00 AM    Protein, total 6.0 (L) 12/07/2016 08:00 AM    Albumin 2.7 (L) 12/07/2016 08:00 AM    Globulin 3.3 12/07/2016 08:00 AM     No results found for: INR, PTMR, PTP, PT1, PT2, APTT   No results found for: IRON, FE, TIBC, IBCT, PSAT, FERR   No results found for: PH, PCO2, PO2  No components found for: [place]   Lab Results   Component Value Date/Time     11/30/2016 12:40 PM    CK - MB 2.6 11/30/2016 12:40 PM              Total time: 15 minutes   Counseling / coordination time, spent as noted above: 10 minutes    > 50% counseling / coordination: yes with wife     Prolonged service was provided for  []30 min   []75 min in face to face time in the presence of the patient, spent as noted above. Time Start:   Time End:   Note: this can only be billed with 30743 (initial) or 03882 (follow up). If multiple start / stop times, list each separately.

## 2018-10-08 NOTE — PROGRESS NOTES
[facility]: [phone] ([phone])  [facility] [phone]   [facility]: [phone]    Patient Name: Justine Sebastian  YOB: 1931    Date of Follow up 10/8/2018  Reason for Consult: care decisions  Requesting Provider: Dr. Melchor Mclaughlin  Primary Care Physician: Javier Ratliff    SUMMARY:   Justine Sebastian is a 80y.o. year old with a past history of Parkinson's disease, dementia, BPH, COPD. He is a long term resident at [facility]. Mr. Michael Saab is bed bound, at times combative due to dementia requires assistance with all of his ADL's. Palliative medicine is consulted for care decisions. PALLIATIVE DIAGNOSES:   1. Advanced care plan discussions  2. Parkinson's disease   3. Dementia   4. Debility    PLAN:   1. 10/8/2018 follow up on patient today. Alert, wife at bedside. She shares he fell out of bed last Friday, emno injuries per wife. He told me he was fine, did not remember the fall. Denies pain today. He could answer a few questions, but not carry a conversation. Goals of care and care decisions DNR/DNI with comfort measures. Continuing to watch for decline. 2.   3. 8/29/18: Mr Doug Orellana was seen for follow up today along with Neymar Cleveland, ROMEO. He was awake and feeding himself breakfast.  Coughing and wet quality noted to voice after swallow. When asked how he is doing he replies \"fine. \" He did allow us to reposition hm. He says a few words but cannot hold a meaningful conversation. He does not appear in any distress. We attended his care plan meeting yesterday and his wife did not have any specific concerns. She has a good understanding of dementia and understands that he is declining. Goals of care are unchanged:  DNR/DNI with comfort measures no feeding tube. Continue same medications and treatments at current time. We will contiinue to follow for family support and symptom management as needed. 4. 8/23/2018 Follow up today with Mr. Tr Betts. Wife feels he is declining, not recognizing her as much. She clearly understands his dementia is progressive and terminal but is saddened by the progression. Mr. Tr Betts was easily aroused from sleep. He did not seem to know his wife upon awakening. Did say good morning, told me he did not have any pain and looked very comfortable. He did not offer any additional conversation. Maybe slowly  Declining, but still retains some appetite, no recent infections. Will hold on hospice referral as he seems stable. Support for Mrs. Tr Betts as journey is long and difficult. Please note below conversation on our initial eval.   There have been no changes in goals of care DNR/DNI with comfort measures no feeding tube. Continue same medications and treatments at current time. 5. Advanced care plan discussions due to effects of dementia Kira Dueñas is not able to participate in medical decision making, when he was able he executed an AMD naming his wife Roni Busch as MPOA. Goals of care are already established, and wife re affirmed with us today, DNR/DNI. Mrs. Tr Betts shares Kira Dueñas is her \" heart and soul\" but understands clearly his Parkinson's nor Dementia are curable and in fact very debilitating. He is currently very debilitated but stable. We discussed expectations of medical decline in the future, including dysphagia, aspiration pneumonia, sepsis, dehydration. She would like to continue same medications for now, does not feel pill burden is to great, and if indicated oral antibiotics are acceptable, but no escalation to the hospital for medical decline, no IVF's no IVAB. If he should fall and hospital eval is warranted this is acceptable. Hospice will be appropriate when medical decline occurs. POST form was signed by wife, indicating DNR/DNI, comfort measures, no feeding tube ever. 6. Symptom management comfortable appearing today tolerating breakfast well. 7. Debility Bed bound, large gentleman who can be combative at times. Requires feeding by wife and staff, incont of bowel and urine. 8. Initial consult note routed to primary continuity provider  9. Communicated plan of care with: Palliative IDT, wife    GOALS OF CARE:  Patient/Health Care Proxy Stated Goals: Comfort      TREATMENT PREFERENCES:   Code Status: DNR DNI     Comfort measures / no feeding tubes/ continue same medications    Advance Care Planning:  Advance Care Planning 11/30/2016   Patient's Healthcare Decision Maker is: Legal Next of Jen Roberts   Primary Decision Maker Name Lary Kelsey   Primary Decision Maker Phone Number [phone]   Primary Decision Maker Relationship to Patient Spouse   Confirm Advance Directive Yes, not on file   Patient Would Like to Complete Advance Directive No       Medical Interventions: Comfort measures   Other Instructions: continue current medications and treatments, do not hospitalize for medical decline  Artificially Administered Nutrition: No feeding tube     Other:  As far as possible, the palliative care team has discussed with patient / health care proxy about goals of care / treatment preferences for patient. HISTORY:     History obtained from: chart and wife    CHIEF COMPLAINT: dementia and debility     HPI/SUBJECTIVE:    The patient is:   [] Verbal and participatory  [x] Non-participatory due to:    10/8/2018 vss , recent fall OOB with no reported injuries. Drinking milk with a straw. Continues to be able to answer some questions but not participate in conversation. He is comfortable appearing. Wife at bedside and reports no issues. Podiatry appointment in near future. 8/29/18: in bed feeding himself breakfast, spilling some food on shirt. Coughing and wet voice quality noted after swallow. Few word responses to some simple questions, cannot hold a conversation. 8/23/2018 lying in bed arouses easily, confused which is baseline with his dementia. Responds verbally to limited questions but does not participate in conversation comfortable appearing. Wife at bedside. Remains bed fast and requires assistance with all ADL's.  80year old male who is a long term resident at [facility] due to severe functional debility related to dementia and Parkinson's. Currently, bed bound requires assistance with all ADL's, he is able to take and tolerate oral medications. Care decisions discussed with his wife, comfort measures, POST signed. Clinical Pain Assessment (nonverbal scale for nonverbal patients): Clinical Pain Assessment  Severity: 0      denies pain, face serene no indicators of pain     Duration: for how long has pt been experiencing pain (e.g., 2 days, 1 month, years)  Frequency: how often pain is an issue (e.g., several times per day, once every few days, constant)     FUNCTIONAL ASSESSMENT:     Palliative Performance Scale (PPS):        ECOG  ECOG Status : Completely disabled     PSYCHOSOCIAL/SPIRITUAL SCREENING:      Any spiritual / Christian concerns: unable to assess for patient, no for wife  [] Yes /  [] No    Caregiver Burnout:  [] Yes /  [x] No /  [] No Caregiver Present      Anticipatory grief assessment:  Unable to assess for patient, no for wife   [] Normal  / [] Maladaptive        REVIEW OF SYSTEMS:     Positive and pertinent negative findings in ROS are noted above in HPI. The following systems were [] reviewed / [x] unable to be reviewed as noted in HPI  Other findings are noted below. Systems: constitutional, ears/nose/mouth/throat, respiratory, gastrointestinal, genitourinary, musculoskeletal, integumentary, neurologic, psychiatric, endocrine. Positive findings noted below.   Modified ESAS Completed by: provider         Pain: 0          Dyspnea: 0     Constipation: No              PHYSICAL EXAM:     Wt Readings from Last 3 Encounters:   12/01/16 85.3 kg (188 lb 1.6 oz)       Constitutional: chronically ill bedfast gentleman who is alert, comfortable appearing in NAD  ENMT: no nasal discharge, moist mucous membranes  Respiratory: breathing not labored  Skin: warm, dry  Neurologic: alert oriented to self, answers simple questions, does not participate in conversation        HISTORY:       Past Medical History:   Diagnosis Date    [facility]     [facility]     colon    [facility]     Dementia     Hyperlipidemia     Hypertension     Muscle weakness     Parkinson's disease ([facility]       Past Surgical History:   Procedure Laterality Date    HX GI      colon cancer    HX HEENT      left eye cataract      No family history on file. History reviewed, no pertinent family history. Social History   Substance Use Topics    Smoking status: Unknown If Ever Smoked    Smokeless tobacco: Not on file    Alcohol use Not on file     No Known Allergies        LAB AND IMAGING FINDINGS:     Lab Results   Component Value Date/Time    WBC 6.5 05/07/2018 03:00 PM    HGB 12.3 (L) 05/07/2018 03:00 PM    PLATELET 396 94/68/8747 03:00 PM     Lab Results   Component Value Date/Time    Sodium 141 05/07/2018 03:00 PM    Potassium 4.4 05/07/2018 03:00 PM    Chloride 108 05/07/2018 03:00 PM    CO2 27 05/07/2018 03:00 PM    BUN 17 05/07/2018 03:00 PM    Creatinine 0.74 05/07/2018 03:00 PM    Calcium 8.2 (L) 05/07/2018 03:00 PM      Lab Results   Component Value Date/Time    AST (SGOT) 13 (L) 12/07/2016 08:00 AM    Alk.  phosphatase 67 12/07/2016 08:00 AM    Protein, total 6.0 (L) 12/07/2016 08:00 AM    Albumin 2.7 (L) 12/07/2016 08:00 AM    Globulin 3.3 12/07/2016 08:00 AM     No results found for: INR, PTMR, PTP, PT1, PT2, APTT   No results found for: IRON, FE, TIBC, IBCT, PSAT, FERR   No results found for: PH, PCO2, PO2  No components found for: [place]   Lab Results   Component Value Date/Time     11/30/2016 12:40 PM    CK - MB 2.6 11/30/2016 12:40 PM              Total time: 15 minutes   Counseling / coordination time, spent as noted above: 10 minutes    > 50% counseling / coordination: yes with wife     Prolonged service was provided for  []30 min   []75 min in face to face time in the presence of the patient, spent as noted above. Time Start:   Time End:   Note: this can only be billed with 37080 (initial) or 56508 (follow up). If multiple start / stop times, list each separately.

## 2018-12-14 NOTE — ED PROVIDER NOTES
EMERGENCY DEPARTMENT HISTORY AND PHYSICAL EXAM    6:05 PM      Date: 12/14/2018  Patient Name: Scarlett Strong    History of Presenting Illness     Chief Complaint   Patient presents with    Fall         History Provided By: EMS and Nursing home staff    Chief Complaint: Fall  Duration:  Prior to arrival  Timing:  Acute  Location: N/A  Quality: N/A  Severity: Moderate  Modifying Factors: N/A  Associated Symptoms: abrasion to right hand      Additional History (Context): 6:05 PM Scarlett Strong is a 80 y.o. male with h/o parkinson's disease, COPD, dementia, HTN, colon cancer, and HLD who presents to ED for evaluation after an acute moderate fall associated with an abrasion to the right hand onset prior to arrival. Per EMS and nursing home report patient was found on the floor. It seemed like he fell out of bed. Patient denies any pain. Hx limited due to patient's dementia. PCP: Yuliana Hart MD    Current Outpatient Medications   Medication Sig Dispense Refill    acetaminophen (TYLENOL) 325 mg tablet Take 650 mg by mouth every four (4) hours as needed for Pain.  aspirin-dipyridamole (AGGRENOX)  mg per SR capsule Take 1 Cap by mouth two (2) times a day.  carbidopa-levodopa CR (SINEMET CR)  mg per tablet Take 1 Tab by mouth two (2) times a day.  tamsulosin (FLOMAX) 0.4 mg capsule Take 0.4 mg by mouth daily.  fluticasone-salmeterol (ADVAIR HFA) 115-21 mcg/actuation inhaler Take 2 Puffs by inhalation two (2) times a day.  midodrine (PROAMITINE) 5 mg tablet Take 5 mg by mouth daily.  magnesium hydroxide (MILK OF MAGNESIA) 400 mg/5 mL suspension Take 30 mL by mouth daily as needed for Constipation.  rasagiline (AZILECT) 1 mg tablet Take 1 mg by mouth daily.  mirtazapine (REMERON) 15 mg tablet Take 15 mg by mouth nightly.  rivastigmine (EXELON) 13.3 mg/24 hour patch 1 Patch by TransDERmal route daily.       therapeutic multivitamin (THERA) tablet Take 1 Tab by mouth daily.  triamcinolone (ARISTOCORT) 0.5 % topical cream Apply  to affected area two (2) times a day. use thin layer      simvastatin (ZOCOR) 40 mg tablet Take  by mouth nightly. Past History     Past Medical History:  Past Medical History:   Diagnosis Date    [facility]     Cancer ([facility]     colon    [email or web address]     Dementia     Hyperlipidemia     Hypertension     Muscle weakness     Parkinson's disease ([email or web address]        Past Surgical History:  Past Surgical History:   Procedure Laterality Date    HX GI      colon cancer    HX HEENT      left eye cataract       Family History:  No family history on file. Social History:  Social History     Tobacco Use    Smoking status: Unknown If Ever Smoked   Substance Use Topics    Alcohol use: Not on file    Drug use: Not on file       Allergies:  No Known Allergies      Review of Systems       Review of Systems   Unable to perform ROS: Dementia         Physical Exam     Visit Vitals  /72   Pulse 83   Temp 98.7 °F (37.1 °C)   Resp 16   SpO2 97%         Physical Exam   Constitutional: No distress. Elderly, frail, NAD, resting comfortably   HENT:   Head: Normocephalic and atraumatic. Right Ear: External ear normal.   Left Ear: External ear normal.   Nose: Nose normal.   Mouth/Throat: Oropharynx is clear and moist.   Eyes: Conjunctivae and EOM are normal. Pupils are equal, round, and reactive to light. No scleral icterus. Neck: Normal range of motion. Neck supple. No tracheal deviation present. Cardiovascular: Normal rate, regular rhythm, normal heart sounds and intact distal pulses. Exam reveals no gallop and no friction rub. No murmur heard. Pulmonary/Chest: Effort normal and breath sounds normal. He exhibits no tenderness. Abdominal: Soft. Bowel sounds are normal. There is no tenderness. Musculoskeletal: He exhibits no edema, tenderness or deformity.    Passive ROM done in all extremities with no pain or resistance. Pt says \"no\" when asked if anything hurts while moving his extremities. Superficial abrasion to dorsum of R hand c/w recent fall. Neurological: He is alert. No cranial nerve deficit. Coordination normal.   Awake, responds to simple questions. Answers \"no\" when asked if any pain. Skin: Skin is warm and dry. Abrasion noted. Superficial abrasion to the dorsum of the right hand   Psychiatric:   Flat affect   Nursing note and vitals reviewed. Diagnostic Study Results     Labs -  No results found for this or any previous visit (from the past 12 hour(s)). Radiologic Studies -   No orders to display         Medical Decision Making   I am the first provider for this patient. I reviewed the vital signs, available nursing notes, past medical history, past surgical history, family history and social history. Vital Signs-Reviewed the patient's vital signs. Pulse Oximetry Analysis -  97% on room air , WNL    Records Reviewed: Nursing Notes and Old Medical Records (Time of Review: 6:05 PM)    ED Course: Progress Notes, Reevaluation, and Consults:    Provider Notes (Medical Decision Making): PT fell out of bed at [facility], sent to ED for eval. Exam shows superficial abrasion to R hand with no other evidence of acute injury. Will send pt back to [facility] for continued care after dressing hand injury. Ruthy Crigler, MD  6:46 PM      Diagnosis     Clinical Impression:   1. Abrasion of right hand, initial encounter    2.  Fall, initial encounter        Disposition: Discharge    Follow-up Information     Follow up With Specialties Details Why Contact Info    Wilian Weaver MD [facility]  As needed [address] [place] [address] EMERGENCY DEPT Emergency Medicine Go to If symptoms worsen [address] [facility]  [phone]              Medication List      ASK your doctor about these medications    acetaminophen 325 mg tablet  Commonly known as:  TYLENOL     AGGRENOX  mg per SR capsule  Generic drug:  aspirin-dipyridamole     carbidopa-levodopa ER  mg per tablet  Commonly known as:  SINEMET CR     FLOMAX 0.4 mg capsule  Generic drug:  tamsulosin     fluticasone-salmeterol 115-21 mcg/actuation inhaler  Commonly known as:  ADVAIR HFA     magnesium hydroxide 400 mg/5 mL suspension  Commonly known as:  MILK OF MAGNESIA     midodrine 5 mg tablet  Commonly known as:  PROAMITINE     rasagiline 1 mg tablet  Commonly known as:  AZILECT     REMERON 15 mg tablet  Generic drug:  mirtazapine     rivastigmine 13.3 mg/24 hour patch  Commonly known as:  EXELON     THERA tablet  Generic drug:  therapeutic multivitamin     triamcinolone 0.5 % topical cream  Commonly known as:  ARISTOCORT     ZOCOR 40 mg tablet  Generic drug:  simvastatin          _______________________________       Scribe Attestation     Paulina Smyth acting as a scribe for and in the presence of Speedy Casillas MD      December 14, 2018 at 6:05 PM       Provider Attestation:      I personally performed the services described in the documentation, reviewed the documentation, as recorded by the scribe in my presence, and it accurately and completely records my words and actions.  December 14, 2018 at 6:05 PM - Speedy Casillas MD    _______________________________

## 2018-12-14 NOTE — DISCHARGE INSTRUCTIONS
Scrapes (Abrasions): Care Instructions  Your Care Instructions  Scrapes (abrasions) are wounds where your skin has been rubbed or torn off. Most scrapes do not go deep into the skin, but some may remove several layers of skin. Scrapes usually don't bleed much, but they may ooze pinkish fluid. Scrapes on the head or face may appear worse than they are. They may bleed a lot because of the good blood supply to this area. Most scrapes heal well and may not need a bandage. They usually heal within 3 to 7 days. A large, deep scrape may take 1 to 2 weeks or longer to heal. A scab may form on some scrapes. Follow-up care is a key part of your treatment and safety. Be sure to make and go to all appointments, and call your doctor if you are having problems. It's also a good idea to know your test results and keep a list of the medicines you take. How can you care for yourself at home? · If your doctor told you how to care for your wound, follow your doctor's instructions. If you did not get instructions, follow this general advice:  ? Wash the scrape with clean water 2 times a day. Don't use hydrogen peroxide or alcohol, which can slow healing. ? You may cover the scrape with a thin layer of petroleum jelly, such as Vaseline, and a nonstick bandage. ? Apply more petroleum jelly and replace the bandage as needed. · Prop up the injured area on a pillow anytime you sit or lie down during the next 3 days. Try to keep it above the level of your heart. This will help reduce swelling. · Be safe with medicines. Take pain medicines exactly as directed. ? If the doctor gave you a prescription medicine for pain, take it as prescribed. ? If you are not taking a prescription pain medicine, ask your doctor if you can take an over-the-counter medicine. When should you call for help?   Call your doctor now or seek immediate medical care if:    · You have signs of infection, such as:  ? Increased pain, swelling, warmth, or redness around the scrape. ? Red streaks leading from the scrape. ? Pus draining from the scrape. ? A fever.     · The scrape starts to bleed, and blood soaks through the bandage. Oozing small amounts of blood is normal.    Watch closely for changes in your health, and be sure to contact your doctor if the scrape is not getting better each day. Where can you learn more? Go to http://timo-jorge.info/. Enter A374 in the search box to learn more about \"Scrapes (Abrasions): Care Instructions. \"  Current as of: November 20, 2017  Content Version: 11.8  © 4468-1661 [employer]. Care instructions adapted under license by [employer] (which disclaims liability or warranty for this information). If you have questions about a medical condition or this instruction, always ask your healthcare professional. [place] [address] any warranty or liability for your use of this information. May return to the nursing home to continue care.

## 2018-12-14 NOTE — ED TRIAGE NOTES
Patient is from [address] [place] [address] home fell out of bed and was sent here to be checked out. Patient has abrasion on the right hand.

## 2018-12-15 NOTE — ED NOTES
Report called to Shahnaz Fink RN. Patient wife called to check up on patient, she was updated on patient status.

## 2019-01-01 ENCOUNTER — APPOINTMENT (OUTPATIENT)
Dept: GENERAL RADIOLOGY | Age: 84
DRG: 871 | End: 2019-01-01
Attending: PHYSICIAN ASSISTANT
Payer: MEDICARE

## 2019-01-01 ENCOUNTER — HOME CARE VISIT (OUTPATIENT)
Dept: SCHEDULING | Facility: HOME HEALTH | Age: 84
End: 2019-01-01
Payer: MEDICARE

## 2019-01-01 ENCOUNTER — HOME CARE VISIT (OUTPATIENT)
Dept: HOSPICE | Facility: HOSPICE | Age: 84
End: 2019-01-01
Payer: MEDICARE

## 2019-01-01 ENCOUNTER — APPOINTMENT (OUTPATIENT)
Dept: ULTRASOUND IMAGING | Age: 84
DRG: 871 | End: 2019-01-01
Attending: PHYSICIAN ASSISTANT
Payer: MEDICARE

## 2019-01-01 ENCOUNTER — HOSPITAL ENCOUNTER (INPATIENT)
Age: 84
LOS: 4 days | Discharge: HOSPICE | DRG: 871 | End: 2019-04-01
Attending: EMERGENCY MEDICINE | Admitting: INTERNAL MEDICINE
Payer: MEDICARE

## 2019-01-01 ENCOUNTER — HOSPICE ADMISSION (OUTPATIENT)
Dept: HOSPICE | Facility: HOSPICE | Age: 84
End: 2019-01-01
Payer: MEDICARE

## 2019-01-01 ENCOUNTER — APPOINTMENT (OUTPATIENT)
Dept: GENERAL RADIOLOGY | Age: 84
DRG: 871 | End: 2019-01-01
Attending: HOSPITALIST
Payer: MEDICARE

## 2019-01-01 ENCOUNTER — APPOINTMENT (OUTPATIENT)
Dept: GENERAL RADIOLOGY | Age: 84
DRG: 871 | End: 2019-01-01
Attending: EMERGENCY MEDICINE
Payer: MEDICARE

## 2019-01-01 ENCOUNTER — HOSPITAL ENCOUNTER (OUTPATIENT)
Dept: LAB | Age: 84
Discharge: HOME | End: 2019-03-05
Payer: MEDICARE

## 2019-01-01 VITALS
SYSTOLIC BLOOD PRESSURE: 122 MMHG | DIASTOLIC BLOOD PRESSURE: 64 MMHG | TEMPERATURE: 97.2 F | RESPIRATION RATE: 16 BRPM | HEART RATE: 84 BPM | OXYGEN SATURATION: 99 %

## 2019-01-01 VITALS
TEMPERATURE: 98.5 F | RESPIRATION RATE: 16 BRPM | SYSTOLIC BLOOD PRESSURE: 104 MMHG | DIASTOLIC BLOOD PRESSURE: 62 MMHG | HEART RATE: 64 BPM | OXYGEN SATURATION: 95 %

## 2019-01-01 VITALS
HEART RATE: 60 BPM | SYSTOLIC BLOOD PRESSURE: 112 MMHG | HEIGHT: 72 IN | WEIGHT: 180 LBS | RESPIRATION RATE: 16 BRPM | DIASTOLIC BLOOD PRESSURE: 70 MMHG | BODY MASS INDEX: 24.38 KG/M2 | TEMPERATURE: 98.1 F | OXYGEN SATURATION: 94 %

## 2019-01-01 VITALS
OXYGEN SATURATION: 98 % | SYSTOLIC BLOOD PRESSURE: 102 MMHG | HEART RATE: 58 BPM | TEMPERATURE: 97.1 F | RESPIRATION RATE: 18 BRPM | DIASTOLIC BLOOD PRESSURE: 70 MMHG

## 2019-01-01 VITALS
WEIGHT: 180 LBS | OXYGEN SATURATION: 100 % | HEART RATE: 59 BPM | BODY MASS INDEX: 24.38 KG/M2 | TEMPERATURE: 98.2 F | DIASTOLIC BLOOD PRESSURE: 89 MMHG | RESPIRATION RATE: 18 BRPM | HEIGHT: 72 IN | SYSTOLIC BLOOD PRESSURE: 129 MMHG

## 2019-01-01 VITALS
OXYGEN SATURATION: 96 % | RESPIRATION RATE: 17 BRPM | DIASTOLIC BLOOD PRESSURE: 71 MMHG | HEART RATE: 60 BPM | SYSTOLIC BLOOD PRESSURE: 109 MMHG | TEMPERATURE: 97.8 F

## 2019-01-01 VITALS — HEART RATE: 95 BPM | OXYGEN SATURATION: 98 % | RESPIRATION RATE: 18 BRPM

## 2019-01-01 DIAGNOSIS — R53.81 DEBILITY: ICD-10-CM

## 2019-01-01 DIAGNOSIS — G30.8 ALZHEIMER'S DISEASE OF OTHER ONSET WITHOUT BEHAVIORAL DISTURBANCE: ICD-10-CM

## 2019-01-01 DIAGNOSIS — F02.80 ALZHEIMER'S DISEASE OF OTHER ONSET WITHOUT BEHAVIORAL DISTURBANCE: ICD-10-CM

## 2019-01-01 DIAGNOSIS — R65.21 SEPTIC SHOCK (HCC): Primary | ICD-10-CM

## 2019-01-01 DIAGNOSIS — Z51.5 HOSPICE CARE PATIENT: ICD-10-CM

## 2019-01-01 DIAGNOSIS — A41.9 SEPTIC SHOCK (HCC): Primary | ICD-10-CM

## 2019-01-01 DIAGNOSIS — Z71.89 ACP (ADVANCE CARE PLANNING): ICD-10-CM

## 2019-01-01 LAB
ALBUMIN SERPL-MCNC: 2.5 G/DL (ref 3.4–5)
ALBUMIN SERPL-MCNC: 2.5 G/DL (ref 3.4–5)
ALBUMIN SERPL-MCNC: 2.7 G/DL (ref 3.4–5)
ALBUMIN SERPL-MCNC: 2.7 G/DL (ref 3.4–5)
ALBUMIN/GLOB SERPL: 0.5 {RATIO} (ref 0.8–1.7)
ALBUMIN/GLOB SERPL: 0.6 {RATIO} (ref 0.8–1.7)
ALBUMIN/GLOB SERPL: 0.6 {RATIO} (ref 0.8–1.7)
ALBUMIN/GLOB SERPL: 0.7 {RATIO} (ref 0.8–1.7)
ALP SERPL-CCNC: 69 U/L (ref 45–117)
ALP SERPL-CCNC: 73 U/L (ref 45–117)
ALP SERPL-CCNC: 87 U/L (ref 45–117)
ALP SERPL-CCNC: 93 U/L (ref 45–117)
ALT SERPL-CCNC: 19 U/L (ref 16–61)
ALT SERPL-CCNC: 6 U/L (ref 16–61)
ALT SERPL-CCNC: 7 U/L (ref 16–61)
ALT SERPL-CCNC: 8 U/L (ref 16–61)
AMMONIA PLAS-SCNC: 20 UMOL/L (ref 11–32)
AMORPH CRY URNS QL MICRO: ABNORMAL
AMPHET UR QL SCN: NEGATIVE
ANION GAP SERPL CALC-SCNC: 10 MMOL/L (ref 3–18)
ANION GAP SERPL CALC-SCNC: 11 MMOL/L (ref 3–18)
ANION GAP SERPL CALC-SCNC: 13 MMOL/L (ref 3–18)
ANION GAP SERPL CALC-SCNC: 14 MMOL/L (ref 3–18)
ANION GAP SERPL CALC-SCNC: 8 MMOL/L (ref 3–18)
APAP SERPL-MCNC: <2 UG/ML (ref 10–30)
APPEARANCE UR: ABNORMAL
APPEARANCE UR: CLEAR
AST SERPL-CCNC: 14 U/L (ref 15–37)
AST SERPL-CCNC: 16 U/L (ref 15–37)
AST SERPL-CCNC: 18 U/L (ref 15–37)
AST SERPL-CCNC: 27 U/L (ref 15–37)
ATRIAL RATE: 94 BPM
BACTERIA SPEC CULT: NORMAL
BACTERIA URNS QL MICRO: ABNORMAL /HPF
BARBITURATES UR QL SCN: NEGATIVE
BASOPHILS # BLD: 0 K/UL (ref 0–0.1)
BASOPHILS NFR BLD: 0 % (ref 0–2)
BENZODIAZ UR QL: NEGATIVE
BILIRUB SERPL-MCNC: 0.4 MG/DL (ref 0.2–1)
BILIRUB SERPL-MCNC: 0.5 MG/DL (ref 0.2–1)
BILIRUB UR QL: NEGATIVE
BILIRUB UR QL: NEGATIVE
BNP SERPL-MCNC: ABNORMAL PG/ML (ref 0–1800)
BUN SERPL-MCNC: 30 MG/DL (ref 7–18)
BUN SERPL-MCNC: 31 MG/DL (ref 7–18)
BUN SERPL-MCNC: 31 MG/DL (ref 7–18)
BUN SERPL-MCNC: 34 MG/DL (ref 7–18)
BUN SERPL-MCNC: 35 MG/DL (ref 7–18)
BUN/CREAT SERPL: 17 (ref 12–20)
BUN/CREAT SERPL: 19 (ref 12–20)
BUN/CREAT SERPL: 24 (ref 12–20)
BUN/CREAT SERPL: 28 (ref 12–20)
BUN/CREAT SERPL: 30 (ref 12–20)
CALCIUM SERPL-MCNC: 7.6 MG/DL (ref 8.5–10.1)
CALCIUM SERPL-MCNC: 7.7 MG/DL (ref 8.5–10.1)
CALCIUM SERPL-MCNC: 8 MG/DL (ref 8.5–10.1)
CALCIUM SERPL-MCNC: 8.5 MG/DL (ref 8.5–10.1)
CALCIUM SERPL-MCNC: 8.6 MG/DL (ref 8.5–10.1)
CALCULATED R AXIS, ECG10: -31 DEGREES
CALCULATED T AXIS, ECG11: -173 DEGREES
CANNABINOIDS UR QL SCN: NEGATIVE
CHLORIDE SERPL-SCNC: 107 MMOL/L (ref 100–108)
CHLORIDE SERPL-SCNC: 110 MMOL/L (ref 100–108)
CHLORIDE SERPL-SCNC: 112 MMOL/L (ref 100–108)
CHLORIDE SERPL-SCNC: 113 MMOL/L (ref 100–108)
CHLORIDE SERPL-SCNC: 113 MMOL/L (ref 100–108)
CK MB CFR SERPL CALC: 1.9 % (ref 0–4)
CK MB CFR SERPL CALC: 10 % (ref 0–4)
CK MB CFR SERPL CALC: 4.6 % (ref 0–4)
CK MB CFR SERPL CALC: 7.4 % (ref 0–4)
CK MB CFR SERPL CALC: 9.3 % (ref 0–4)
CK MB SERPL-MCNC: 1.5 NG/ML (ref 5–25)
CK MB SERPL-MCNC: 2.9 NG/ML (ref 5–25)
CK MB SERPL-MCNC: 3.5 NG/ML (ref 5–25)
CK MB SERPL-MCNC: 4.1 NG/ML (ref 5–25)
CK MB SERPL-MCNC: 4.5 NG/ML (ref 5–25)
CK SERPL-CCNC: 35 U/L (ref 39–308)
CK SERPL-CCNC: 44 U/L (ref 39–308)
CK SERPL-CCNC: 61 U/L (ref 39–308)
CK SERPL-CCNC: 63 U/L (ref 39–308)
CK SERPL-CCNC: 77 U/L (ref 39–308)
CO2 SERPL-SCNC: 20 MMOL/L (ref 21–32)
CO2 SERPL-SCNC: 20 MMOL/L (ref 21–32)
CO2 SERPL-SCNC: 21 MMOL/L (ref 21–32)
CO2 SERPL-SCNC: 22 MMOL/L (ref 21–32)
CO2 SERPL-SCNC: 22 MMOL/L (ref 21–32)
COCAINE UR QL SCN: NEGATIVE
COLOR UR: YELLOW
COLOR UR: YELLOW
CREAT SERPL-MCNC: 1.09 MG/DL (ref 0.6–1.3)
CREAT SERPL-MCNC: 1.16 MG/DL (ref 0.6–1.3)
CREAT SERPL-MCNC: 1.43 MG/DL (ref 0.6–1.3)
CREAT SERPL-MCNC: 1.58 MG/DL (ref 0.6–1.3)
CREAT SERPL-MCNC: 1.84 MG/DL (ref 0.6–1.3)
DIAGNOSIS, 93000: NORMAL
DIFFERENTIAL METHOD BLD: ABNORMAL
EOSINOPHIL # BLD: 0 K/UL (ref 0–0.4)
EOSINOPHIL NFR BLD: 0 % (ref 0–5)
EPITH CASTS URNS QL MICRO: ABNORMAL /LPF (ref 0–5)
ERYTHROCYTE [DISTWIDTH] IN BLOOD BY AUTOMATED COUNT: 13.6 % (ref 11.6–14.5)
ERYTHROCYTE [DISTWIDTH] IN BLOOD BY AUTOMATED COUNT: 13.7 % (ref 11.6–14.5)
ERYTHROCYTE [DISTWIDTH] IN BLOOD BY AUTOMATED COUNT: 13.7 % (ref 11.6–14.5)
ERYTHROCYTE [DISTWIDTH] IN BLOOD BY AUTOMATED COUNT: 13.9 % (ref 11.6–14.5)
ERYTHROCYTE [DISTWIDTH] IN BLOOD BY AUTOMATED COUNT: 13.9 % (ref 11.6–14.5)
FLUAV RNA SPEC QL NAA+PROBE: NEGATIVE
FLUBV RNA SPEC QL NAA+PROBE: NEGATIVE
GLOBULIN SER CALC-MCNC: 3.9 G/DL (ref 2–4)
GLOBULIN SER CALC-MCNC: 4.1 G/DL (ref 2–4)
GLOBULIN SER CALC-MCNC: 4.3 G/DL (ref 2–4)
GLOBULIN SER CALC-MCNC: 5.2 G/DL (ref 2–4)
GLUCOSE BLD STRIP.AUTO-MCNC: 109 MG/DL (ref 70–110)
GLUCOSE BLD STRIP.AUTO-MCNC: 112 MG/DL (ref 70–110)
GLUCOSE BLD STRIP.AUTO-MCNC: 123 MG/DL (ref 70–110)
GLUCOSE BLD STRIP.AUTO-MCNC: 134 MG/DL (ref 70–110)
GLUCOSE BLD STRIP.AUTO-MCNC: 155 MG/DL (ref 70–110)
GLUCOSE BLD STRIP.AUTO-MCNC: 169 MG/DL (ref 70–110)
GLUCOSE BLD STRIP.AUTO-MCNC: 180 MG/DL (ref 70–110)
GLUCOSE SERPL-MCNC: 125 MG/DL (ref 74–99)
GLUCOSE SERPL-MCNC: 134 MG/DL (ref 74–99)
GLUCOSE SERPL-MCNC: 154 MG/DL (ref 74–99)
GLUCOSE SERPL-MCNC: 154 MG/DL (ref 74–99)
GLUCOSE SERPL-MCNC: 170 MG/DL (ref 74–99)
GLUCOSE UR STRIP.AUTO-MCNC: NEGATIVE MG/DL
GLUCOSE UR STRIP.AUTO-MCNC: NEGATIVE MG/DL
HCT VFR BLD AUTO: 29.2 % (ref 36–48)
HCT VFR BLD AUTO: 30.8 % (ref 36–48)
HCT VFR BLD AUTO: 31.1 % (ref 36–48)
HCT VFR BLD AUTO: 35 % (ref 36–48)
HCT VFR BLD AUTO: 37.4 % (ref 36–48)
HDSCOM,HDSCOM: NORMAL
HGB BLD-MCNC: 10.1 G/DL (ref 13–16)
HGB BLD-MCNC: 11.2 G/DL (ref 13–16)
HGB BLD-MCNC: 11.9 G/DL (ref 13–16)
HGB BLD-MCNC: 9.6 G/DL (ref 13–16)
HGB BLD-MCNC: 9.6 G/DL (ref 13–16)
HGB UR QL STRIP: ABNORMAL
HGB UR QL STRIP: NEGATIVE
KETONES UR QL STRIP.AUTO: NEGATIVE MG/DL
KETONES UR QL STRIP.AUTO: NEGATIVE MG/DL
L PNEUMO AG UR QL IA: NEGATIVE
LACTATE BLD-SCNC: 1.34 MMOL/L (ref 0.4–2)
LACTATE BLD-SCNC: 3.16 MMOL/L (ref 0.4–2)
LACTATE SERPL-SCNC: 1.5 MMOL/L (ref 0.4–2)
LACTATE SERPL-SCNC: 2 MMOL/L (ref 0.4–2)
LEUKOCYTE ESTERASE UR QL STRIP.AUTO: ABNORMAL
LEUKOCYTE ESTERASE UR QL STRIP.AUTO: NEGATIVE
LYMPHOCYTES # BLD: 0.6 K/UL (ref 0.9–3.6)
LYMPHOCYTES # BLD: 0.7 K/UL (ref 0.9–3.6)
LYMPHOCYTES # BLD: 0.9 K/UL (ref 0.9–3.6)
LYMPHOCYTES # BLD: 1.2 K/UL (ref 0.9–3.6)
LYMPHOCYTES NFR BLD: 6 % (ref 21–52)
LYMPHOCYTES NFR BLD: 6 % (ref 21–52)
LYMPHOCYTES NFR BLD: 7 % (ref 21–52)
LYMPHOCYTES NFR BLD: 8 % (ref 21–52)
MAGNESIUM SERPL-MCNC: 2 MG/DL (ref 1.6–2.6)
MAGNESIUM SERPL-MCNC: 2.1 MG/DL (ref 1.6–2.6)
MAGNESIUM SERPL-MCNC: 2.3 MG/DL (ref 1.6–2.6)
MAGNESIUM SERPL-MCNC: 2.5 MG/DL (ref 1.6–2.6)
MCH RBC QN AUTO: 29.4 PG (ref 24–34)
MCH RBC QN AUTO: 30.4 PG (ref 24–34)
MCH RBC QN AUTO: 30.8 PG (ref 24–34)
MCHC RBC AUTO-ENTMCNC: 31.2 G/DL (ref 31–37)
MCHC RBC AUTO-ENTMCNC: 31.8 G/DL (ref 31–37)
MCHC RBC AUTO-ENTMCNC: 32 G/DL (ref 31–37)
MCHC RBC AUTO-ENTMCNC: 32.5 G/DL (ref 31–37)
MCHC RBC AUTO-ENTMCNC: 32.9 G/DL (ref 31–37)
MCV RBC AUTO: 92.4 FL (ref 74–97)
MCV RBC AUTO: 93.7 FL (ref 74–97)
MCV RBC AUTO: 94.5 FL (ref 74–97)
MCV RBC AUTO: 95.1 FL (ref 74–97)
MCV RBC AUTO: 96.9 FL (ref 74–97)
METHADONE UR QL: NEGATIVE
MONOCYTES # BLD: 0.5 K/UL (ref 0.05–1.2)
MONOCYTES # BLD: 0.6 K/UL (ref 0.05–1.2)
MONOCYTES # BLD: 0.7 K/UL (ref 0.05–1.2)
MONOCYTES # BLD: 1.2 K/UL (ref 0.05–1.2)
MONOCYTES NFR BLD: 4 % (ref 3–10)
MONOCYTES NFR BLD: 5 % (ref 3–10)
MONOCYTES NFR BLD: 6 % (ref 3–10)
MONOCYTES NFR BLD: 8 % (ref 3–10)
MUCOUS THREADS URNS QL MICRO: ABNORMAL /LPF
NEUTS SEG # BLD: 12.6 K/UL (ref 1.8–8)
NEUTS SEG # BLD: 13.1 K/UL (ref 1.8–8)
NEUTS SEG # BLD: 8.8 K/UL (ref 1.8–8)
NEUTS SEG # BLD: 9.1 K/UL (ref 1.8–8)
NEUTS SEG NFR BLD: 86 % (ref 40–73)
NEUTS SEG NFR BLD: 88 % (ref 40–73)
NITRITE UR QL STRIP.AUTO: NEGATIVE
NITRITE UR QL STRIP.AUTO: NEGATIVE
OPIATES UR QL: NEGATIVE
PCP UR QL: NEGATIVE
PH UR STRIP: 5 [PH] (ref 5–8)
PH UR STRIP: 7 [PH] (ref 5–8)
PHOSPHATE SERPL-MCNC: 1.9 MG/DL (ref 2.5–4.9)
PHOSPHATE SERPL-MCNC: 3.1 MG/DL (ref 2.5–4.9)
PHOSPHATE SERPL-MCNC: 3.5 MG/DL (ref 2.5–4.9)
PHOSPHATE SERPL-MCNC: 4.7 MG/DL (ref 2.5–4.9)
PLATELET # BLD AUTO: 185 K/UL (ref 135–420)
PLATELET # BLD AUTO: 229 K/UL (ref 135–420)
PLATELET # BLD AUTO: 230 K/UL (ref 135–420)
PLATELET # BLD AUTO: 262 K/UL (ref 135–420)
PLATELET # BLD AUTO: 288 K/UL (ref 135–420)
PMV BLD AUTO: 10 FL (ref 9.2–11.8)
PMV BLD AUTO: 10.4 FL (ref 9.2–11.8)
PMV BLD AUTO: 10.4 FL (ref 9.2–11.8)
PMV BLD AUTO: 10.7 FL (ref 9.2–11.8)
PMV BLD AUTO: 10.9 FL (ref 9.2–11.8)
POTASSIUM SERPL-SCNC: 3 MMOL/L (ref 3.5–5.5)
POTASSIUM SERPL-SCNC: 3.8 MMOL/L (ref 3.5–5.5)
POTASSIUM SERPL-SCNC: 4 MMOL/L (ref 3.5–5.5)
POTASSIUM SERPL-SCNC: 4 MMOL/L (ref 3.5–5.5)
POTASSIUM SERPL-SCNC: 4.4 MMOL/L (ref 3.5–5.5)
PROT SERPL-MCNC: 6.4 G/DL (ref 6.4–8.2)
PROT SERPL-MCNC: 6.8 G/DL (ref 6.4–8.2)
PROT SERPL-MCNC: 6.8 G/DL (ref 6.4–8.2)
PROT SERPL-MCNC: 7.9 G/DL (ref 6.4–8.2)
PROT UR STRIP-MCNC: 30 MG/DL
PROT UR STRIP-MCNC: NEGATIVE MG/DL
Q-T INTERVAL, ECG07: 350 MS
QRS DURATION, ECG06: 96 MS
QTC CALCULATION (BEZET), ECG08: 494 MS
RBC # BLD AUTO: 3.16 M/UL (ref 4.7–5.5)
RBC # BLD AUTO: 3.26 M/UL (ref 4.7–5.5)
RBC # BLD AUTO: 3.32 M/UL (ref 4.7–5.5)
RBC # BLD AUTO: 3.68 M/UL (ref 4.7–5.5)
RBC # BLD AUTO: 3.86 M/UL (ref 4.7–5.5)
RBC #/AREA URNS HPF: ABNORMAL /HPF (ref 0–5)
S PNEUM AG UR QL: NEGATIVE
SALICYLATES SERPL-MCNC: <2.8 MG/DL (ref 2.8–20)
SERVICE CMNT-IMP: NORMAL
SODIUM SERPL-SCNC: 141 MMOL/L (ref 136–145)
SODIUM SERPL-SCNC: 143 MMOL/L (ref 136–145)
SODIUM SERPL-SCNC: 146 MMOL/L (ref 136–145)
SP GR UR REFRACTOMETRY: 1.02 (ref 1–1.03)
SP GR UR REFRACTOMETRY: 1.02 (ref 1–1.03)
TROPONIN I SERPL-MCNC: 0.06 NG/ML (ref 0–0.04)
TROPONIN I SERPL-MCNC: 0.23 NG/ML (ref 0–0.04)
TROPONIN I SERPL-MCNC: 0.3 NG/ML (ref 0–0.04)
TROPONIN I SERPL-MCNC: 0.41 NG/ML (ref 0–0.04)
TROPONIN I SERPL-MCNC: 0.49 NG/ML (ref 0–0.04)
TROPONIN I SERPL-MCNC: 0.61 NG/ML (ref 0–0.04)
TSH SERPL DL<=0.05 MIU/L-ACNC: 1.43 UIU/ML (ref 0.36–3.74)
UROBILINOGEN UR QL STRIP.AUTO: 0.2 EU/DL (ref 0.2–1)
UROBILINOGEN UR QL STRIP.AUTO: 0.2 EU/DL (ref 0.2–1)
VANCOMYCIN SERPL-MCNC: 13.3 UG/ML (ref 5–40)
VENTRICULAR RATE, ECG03: 120 BPM
WBC # BLD AUTO: 10 K/UL (ref 4.6–13.2)
WBC # BLD AUTO: 10.4 K/UL (ref 4.6–13.2)
WBC # BLD AUTO: 14.7 K/UL (ref 4.6–13.2)
WBC # BLD AUTO: 14.9 K/UL (ref 4.6–13.2)
WBC # BLD AUTO: 7.4 K/UL (ref 4.6–13.2)
WBC URNS QL MICRO: ABNORMAL /HPF (ref 0–4)

## 2019-01-01 PROCEDURE — G0156 HHCP-SVS OF AIDE,EA 15 MIN: HCPCS

## 2019-01-01 PROCEDURE — 65270000029 HC RM PRIVATE

## 2019-01-01 PROCEDURE — HOSPICE MEDICATION HC HH HOSPICE MEDICATION

## 2019-01-01 PROCEDURE — G0299 HHS/HOSPICE OF RN EA 15 MIN: HCPCS

## 2019-01-01 PROCEDURE — 0651 HSPC ROUTINE HOME CARE

## 2019-01-01 PROCEDURE — 85027 COMPLETE CBC AUTOMATED: CPT

## 2019-01-01 PROCEDURE — 74011250636 HC RX REV CODE- 250/636: Performed by: INTERNAL MEDICINE

## 2019-01-01 PROCEDURE — 74011000258 HC RX REV CODE- 258: Performed by: NURSE PRACTITIONER

## 2019-01-01 PROCEDURE — 80048 BASIC METABOLIC PNL TOTAL CA: CPT

## 2019-01-01 PROCEDURE — 80053 COMPREHEN METABOLIC PANEL: CPT

## 2019-01-01 PROCEDURE — 77030034850

## 2019-01-01 PROCEDURE — 85025 COMPLETE CBC W/AUTO DIFF WBC: CPT

## 2019-01-01 PROCEDURE — 94640 AIRWAY INHALATION TREATMENT: CPT

## 2019-01-01 PROCEDURE — 84100 ASSAY OF PHOSPHORUS: CPT

## 2019-01-01 PROCEDURE — 3331090004 HSPC SERVICE INTENSITY ADD-ON

## 2019-01-01 PROCEDURE — 74230 X-RAY XM SWLNG FUNCJ C+: CPT

## 2019-01-01 PROCEDURE — A4314 CATH W/DRAINAGE 2-WAY LATEX: HCPCS

## 2019-01-01 PROCEDURE — A4649 SURGICAL SUPPLIES: HCPCS

## 2019-01-01 PROCEDURE — 82962 GLUCOSE BLOOD TEST: CPT

## 2019-01-01 PROCEDURE — 99285 EMERGENCY DEPT VISIT HI MDM: CPT

## 2019-01-01 PROCEDURE — 92526 ORAL FUNCTION THERAPY: CPT

## 2019-01-01 PROCEDURE — A9270 NON-COVERED ITEM OR SERVICE: HCPCS

## 2019-01-01 PROCEDURE — 87450 LEGIONELLA PNEUMOPHILA AG, URINE: CPT

## 2019-01-01 PROCEDURE — T4524 ADULT SIZE BRIEF/DIAPER XL: HCPCS

## 2019-01-01 PROCEDURE — 74011250636 HC RX REV CODE- 250/636: Performed by: PHYSICIAN ASSISTANT

## 2019-01-01 PROCEDURE — 83605 ASSAY OF LACTIC ACID: CPT

## 2019-01-01 PROCEDURE — 81001 URINALYSIS AUTO W/SCOPE: CPT

## 2019-01-01 PROCEDURE — 93005 ELECTROCARDIOGRAM TRACING: CPT

## 2019-01-01 PROCEDURE — 71045 X-RAY EXAM CHEST 1 VIEW: CPT

## 2019-01-01 PROCEDURE — T4541 LARGE DISPOSABLE UNDERPAD: HCPCS

## 2019-01-01 PROCEDURE — 87086 URINE CULTURE/COLONY COUNT: CPT

## 2019-01-01 PROCEDURE — 36592 COLLECT BLOOD FROM PICC: CPT

## 2019-01-01 PROCEDURE — 36415 COLL VENOUS BLD VENIPUNCTURE: CPT

## 2019-01-01 PROCEDURE — 83735 ASSAY OF MAGNESIUM: CPT

## 2019-01-01 PROCEDURE — 84484 ASSAY OF TROPONIN QUANT: CPT

## 2019-01-01 PROCEDURE — 80307 DRUG TEST PRSMV CHEM ANLYZR: CPT

## 2019-01-01 PROCEDURE — 77030011943

## 2019-01-01 PROCEDURE — 51798 US URINE CAPACITY MEASURE: CPT

## 2019-01-01 PROCEDURE — 74011000258 HC RX REV CODE- 258: Performed by: PHYSICIAN ASSISTANT

## 2019-01-01 PROCEDURE — 74011000250 HC RX REV CODE- 250: Performed by: INTERNAL MEDICINE

## 2019-01-01 PROCEDURE — A6250 SKIN SEAL PROTECT MOISTURIZR: HCPCS

## 2019-01-01 PROCEDURE — 3336500001 HSPC ELECTION

## 2019-01-01 PROCEDURE — 82550 ASSAY OF CK (CPK): CPT

## 2019-01-01 PROCEDURE — 84443 ASSAY THYROID STIM HORMONE: CPT

## 2019-01-01 PROCEDURE — 96374 THER/PROPH/DIAG INJ IV PUSH: CPT

## 2019-01-01 PROCEDURE — 87040 BLOOD CULTURE FOR BACTERIA: CPT

## 2019-01-01 PROCEDURE — A9150 MISC/EXPER NON-PRESCRIPT DRU: HCPCS

## 2019-01-01 PROCEDURE — 92610 EVALUATE SWALLOWING FUNCTION: CPT

## 2019-01-01 PROCEDURE — G0155 HHCP-SVS OF CSW,EA 15 MIN: HCPCS

## 2019-01-01 PROCEDURE — A4340 INDWELLING CATHETER SPECIAL: HCPCS

## 2019-01-01 PROCEDURE — 87449 NOS EACH ORGANISM AG IA: CPT

## 2019-01-01 PROCEDURE — 74011000258 HC RX REV CODE- 258: Performed by: INTERNAL MEDICINE

## 2019-01-01 PROCEDURE — 77030020186 HC BOOT HL PROTCT SAGE -B

## 2019-01-01 PROCEDURE — 65610000006 HC RM INTENSIVE CARE

## 2019-01-01 PROCEDURE — 02HV33Z INSERTION OF INFUSION DEVICE INTO SUPERIOR VENA CAVA, PERCUTANEOUS APPROACH: ICD-10-PCS | Performed by: EMERGENCY MEDICINE

## 2019-01-01 PROCEDURE — 87641 MR-STAPH DNA AMP PROBE: CPT

## 2019-01-01 PROCEDURE — 92611 MOTION FLUOROSCOPY/SWALLOW: CPT

## 2019-01-01 PROCEDURE — 77030037870 HC GLD SHT PREVALON SAGE -B

## 2019-01-01 PROCEDURE — 75810000137 HC PLCMT CENT VENOUS CATH

## 2019-01-01 PROCEDURE — 74011250636 HC RX REV CODE- 250/636

## 2019-01-01 PROCEDURE — 74011000250 HC RX REV CODE- 250: Performed by: PHYSICIAN ASSISTANT

## 2019-01-01 PROCEDURE — C1751 CATH, INF, PER/CENT/MIDLINE: HCPCS

## 2019-01-01 PROCEDURE — 94762 N-INVAS EAR/PLS OXIMTRY CONT: CPT

## 2019-01-01 PROCEDURE — 83880 ASSAY OF NATRIURETIC PEPTIDE: CPT

## 2019-01-01 PROCEDURE — HHS10335 MOUTHWASH  FLAVOR ALCOHOL FREE 4 OZ

## 2019-01-01 PROCEDURE — 87502 INFLUENZA DNA AMP PROBE: CPT

## 2019-01-01 PROCEDURE — A4927 NON-STERILE GLOVES: HCPCS

## 2019-01-01 PROCEDURE — 74011250636 HC RX REV CODE- 250/636: Performed by: EMERGENCY MEDICINE

## 2019-01-01 PROCEDURE — 76450000000

## 2019-01-01 PROCEDURE — 80202 ASSAY OF VANCOMYCIN: CPT

## 2019-01-01 PROCEDURE — HHS10554 SHAMPOO/BODY WASH 8 OZ ALOE VESTA

## 2019-01-01 PROCEDURE — 81003 URINALYSIS AUTO W/O SCOPE: CPT

## 2019-01-01 PROCEDURE — 96361 HYDRATE IV INFUSION ADD-ON: CPT

## 2019-01-01 PROCEDURE — 76770 US EXAM ABDO BACK WALL COMP: CPT

## 2019-01-01 PROCEDURE — P9045 ALBUMIN (HUMAN), 5%, 250 ML: HCPCS | Performed by: PHYSICIAN ASSISTANT

## 2019-01-01 PROCEDURE — 82140 ASSAY OF AMMONIA: CPT

## 2019-01-01 PROCEDURE — 74011000255 HC RX REV CODE- 255: Performed by: FAMILY MEDICINE

## 2019-01-01 RX ORDER — HYDROCORTISONE SODIUM SUCCINATE 100 MG/2ML
50 INJECTION, POWDER, FOR SOLUTION INTRAMUSCULAR; INTRAVENOUS EVERY 12 HOURS
Status: COMPLETED | OUTPATIENT
Start: 2019-01-01 | End: 2019-01-01

## 2019-01-01 RX ORDER — LORAZEPAM 2 MG/ML
1 CONCENTRATE ORAL
Qty: 30 ML | Refills: 0 | Status: SHIPPED | OUTPATIENT
Start: 2019-01-01

## 2019-01-01 RX ORDER — POTASSIUM CHLORIDE 7.45 MG/ML
10 INJECTION INTRAVENOUS
Status: COMPLETED | OUTPATIENT
Start: 2019-01-01 | End: 2019-01-01

## 2019-01-01 RX ORDER — CEFTRIAXONE 1 G/1
1 INJECTION, POWDER, FOR SOLUTION INTRAMUSCULAR; INTRAVENOUS
Status: COMPLETED | OUTPATIENT
Start: 2019-01-01 | End: 2019-01-01

## 2019-01-01 RX ORDER — SODIUM CHLORIDE 0.9 % (FLUSH) 0.9 %
5-40 SYRINGE (ML) INJECTION EVERY 8 HOURS
Status: DISCONTINUED | OUTPATIENT
Start: 2019-01-01 | End: 2019-01-01 | Stop reason: HOSPADM

## 2019-01-01 RX ORDER — HEPARIN SODIUM 5000 [USP'U]/ML
5000 INJECTION, SOLUTION INTRAVENOUS; SUBCUTANEOUS EVERY 8 HOURS
Status: DISCONTINUED | OUTPATIENT
Start: 2019-01-01 | End: 2019-01-01 | Stop reason: HOSPADM

## 2019-01-01 RX ORDER — ALBUMIN HUMAN 50 G/1000ML
25 SOLUTION INTRAVENOUS ONCE
Status: COMPLETED | OUTPATIENT
Start: 2019-01-01 | End: 2019-01-01

## 2019-01-01 RX ORDER — IPRATROPIUM BROMIDE AND ALBUTEROL SULFATE 2.5; .5 MG/3ML; MG/3ML
3 SOLUTION RESPIRATORY (INHALATION)
Status: DISCONTINUED | OUTPATIENT
Start: 2019-01-01 | End: 2019-01-01

## 2019-01-01 RX ORDER — IPRATROPIUM BROMIDE AND ALBUTEROL SULFATE 2.5; .5 MG/3ML; MG/3ML
3 SOLUTION RESPIRATORY (INHALATION)
Status: DISCONTINUED | OUTPATIENT
Start: 2019-01-01 | End: 2019-01-01 | Stop reason: HOSPADM

## 2019-01-01 RX ORDER — HYDROCORTISONE SODIUM SUCCINATE 100 MG/2ML
50 INJECTION, POWDER, FOR SOLUTION INTRAMUSCULAR; INTRAVENOUS EVERY 6 HOURS
Status: DISCONTINUED | OUTPATIENT
Start: 2019-01-01 | End: 2019-01-01

## 2019-01-01 RX ORDER — POTASSIUM CHLORIDE 7.45 MG/ML
INJECTION INTRAVENOUS
Status: COMPLETED
Start: 2019-01-01 | End: 2019-01-01

## 2019-01-01 RX ORDER — PROMETHAZINE HYDROCHLORIDE 25 MG/1
25 SUPPOSITORY RECTAL
Qty: 15 SUPPOSITORY | Refills: 0 | Status: SHIPPED | OUTPATIENT
Start: 2019-01-01

## 2019-01-01 RX ORDER — MAGNESIUM SULFATE 100 %
4 CRYSTALS MISCELLANEOUS AS NEEDED
Status: DISCONTINUED | OUTPATIENT
Start: 2019-01-01 | End: 2019-01-01 | Stop reason: HOSPADM

## 2019-01-01 RX ORDER — VANCOMYCIN 2 GRAM/500 ML IN 0.9 % SODIUM CHLORIDE INTRAVENOUS
2000 ONCE
Status: COMPLETED | OUTPATIENT
Start: 2019-01-01 | End: 2019-01-01

## 2019-01-01 RX ORDER — HYDROCORTISONE SODIUM SUCCINATE 100 MG/2ML
50 INJECTION, POWDER, FOR SOLUTION INTRAMUSCULAR; INTRAVENOUS ONCE
Status: DISCONTINUED | OUTPATIENT
Start: 2019-01-01 | End: 2019-01-01 | Stop reason: HOSPADM

## 2019-01-01 RX ORDER — SCOLOPAMINE TRANSDERMAL SYSTEM 1 MG/1
1 PATCH, EXTENDED RELEASE TRANSDERMAL
Qty: 3 PATCH | Refills: 0 | Status: SHIPPED | OUTPATIENT
Start: 2019-01-01

## 2019-01-01 RX ORDER — BUDESONIDE 0.5 MG/2ML
500 INHALANT ORAL
Status: DISCONTINUED | OUTPATIENT
Start: 2019-01-01 | End: 2019-01-01 | Stop reason: HOSPADM

## 2019-01-01 RX ORDER — SODIUM CHLORIDE 0.9 % (FLUSH) 0.9 %
5-10 SYRINGE (ML) INJECTION AS NEEDED
Status: DISCONTINUED | OUTPATIENT
Start: 2019-01-01 | End: 2019-01-01

## 2019-01-01 RX ORDER — SODIUM CHLORIDE 0.9 % (FLUSH) 0.9 %
5-40 SYRINGE (ML) INJECTION AS NEEDED
Status: DISCONTINUED | OUTPATIENT
Start: 2019-01-01 | End: 2019-01-01 | Stop reason: HOSPADM

## 2019-01-01 RX ORDER — HYDROCORTISONE SODIUM SUCCINATE 100 MG/2ML
50 INJECTION, POWDER, FOR SOLUTION INTRAMUSCULAR; INTRAVENOUS EVERY 8 HOURS
Status: COMPLETED | OUTPATIENT
Start: 2019-01-01 | End: 2019-01-01

## 2019-01-01 RX ORDER — DEXTROSE MONOHYDRATE 50 MG/ML
50 INJECTION, SOLUTION INTRAVENOUS CONTINUOUS
Status: DISCONTINUED | OUTPATIENT
Start: 2019-01-01 | End: 2019-01-01 | Stop reason: HOSPADM

## 2019-01-01 RX ORDER — DEXTROSE 50 % IN WATER (D50W) INTRAVENOUS SYRINGE
25-50 AS NEEDED
Status: DISCONTINUED | OUTPATIENT
Start: 2019-01-01 | End: 2019-01-01 | Stop reason: HOSPADM

## 2019-01-01 RX ORDER — NOREPINEPHRINE BITARTRATE/D5W 8 MG/250ML
2-30 PLASTIC BAG, INJECTION (ML) INTRAVENOUS
Status: DISCONTINUED | OUTPATIENT
Start: 2019-01-01 | End: 2019-01-01

## 2019-01-01 RX ORDER — MORPHINE SULFATE 20 MG/ML
10 SOLUTION ORAL
Qty: 30 ML | Refills: 0 | Status: SHIPPED | OUTPATIENT
Start: 2019-01-01 | End: 2019-01-01

## 2019-01-01 RX ORDER — SODIUM CHLORIDE 9 MG/ML
150 INJECTION, SOLUTION INTRAVENOUS CONTINUOUS
Status: DISCONTINUED | OUTPATIENT
Start: 2019-01-01 | End: 2019-01-01

## 2019-01-01 RX ORDER — DOPAMINE HYDROCHLORIDE 160 MG/100ML
0-20 INJECTION, SOLUTION INTRAVENOUS
Status: DISCONTINUED | OUTPATIENT
Start: 2019-01-01 | End: 2019-01-01

## 2019-01-01 RX ADMIN — PIPERACILLIN SODIUM,TAZOBACTAM SODIUM 4.5 G: 4; .5 INJECTION, POWDER, FOR SOLUTION INTRAVENOUS at 14:36

## 2019-01-01 RX ADMIN — POTASSIUM CHLORIDE 10 MEQ: 7.45 INJECTION INTRAVENOUS at 07:59

## 2019-01-01 RX ADMIN — PIPERACILLIN SODIUM,TAZOBACTAM SODIUM 4.5 G: 4; .5 INJECTION, POWDER, FOR SOLUTION INTRAVENOUS at 03:15

## 2019-01-01 RX ADMIN — PIPERACILLIN AND TAZOBACTAM 3.38 G: 3; .375 INJECTION, POWDER, FOR SOLUTION INTRAVENOUS at 21:30

## 2019-01-01 RX ADMIN — BUDESONIDE 500 MCG: 0.5 INHALANT RESPIRATORY (INHALATION) at 21:07

## 2019-01-01 RX ADMIN — HYDROCORTISONE SODIUM SUCCINATE 50 MG: 100 INJECTION, POWDER, FOR SOLUTION INTRAMUSCULAR; INTRAVENOUS at 18:23

## 2019-01-01 RX ADMIN — CEFTRIAXONE SODIUM 1 G: 1 INJECTION, POWDER, FOR SOLUTION INTRAMUSCULAR; INTRAVENOUS at 12:22

## 2019-01-01 RX ADMIN — DEXTROSE MONOHYDRATE 50 ML/HR: 5 INJECTION, SOLUTION INTRAVENOUS at 21:57

## 2019-01-01 RX ADMIN — SODIUM CHLORIDE, SODIUM ACETATE ANHYDROUS, SODIUM GLUCONATE, POTASSIUM CHLORIDE, AND MAGNESIUM CHLORIDE 125 ML: 526; 222; 502; 37; 30 INJECTION, SOLUTION INTRAVENOUS at 14:56

## 2019-01-01 RX ADMIN — PIPERACILLIN SODIUM,TAZOBACTAM SODIUM 4.5 G: 4; .5 INJECTION, POWDER, FOR SOLUTION INTRAVENOUS at 21:30

## 2019-01-01 RX ADMIN — IPRATROPIUM BROMIDE AND ALBUTEROL SULFATE 3 ML: .5; 3 SOLUTION RESPIRATORY (INHALATION) at 01:12

## 2019-01-01 RX ADMIN — Medication 10 ML: at 06:01

## 2019-01-01 RX ADMIN — SODIUM CHLORIDE 1000 ML: 900 INJECTION, SOLUTION INTRAVENOUS at 11:00

## 2019-01-01 RX ADMIN — PIPERACILLIN SODIUM,TAZOBACTAM SODIUM 4.5 G: 4; .5 INJECTION, POWDER, FOR SOLUTION INTRAVENOUS at 20:44

## 2019-01-01 RX ADMIN — HEPARIN SODIUM 5000 UNITS: 5000 INJECTION INTRAVENOUS; SUBCUTANEOUS at 15:02

## 2019-01-01 RX ADMIN — HYDROCORTISONE SODIUM SUCCINATE 50 MG: 100 INJECTION, POWDER, FOR SOLUTION INTRAMUSCULAR; INTRAVENOUS at 06:23

## 2019-01-01 RX ADMIN — HEPARIN SODIUM 5000 UNITS: 5000 INJECTION INTRAVENOUS; SUBCUTANEOUS at 23:20

## 2019-01-01 RX ADMIN — BUDESONIDE 500 MCG: 0.5 INHALANT RESPIRATORY (INHALATION) at 10:21

## 2019-01-01 RX ADMIN — Medication 10 ML: at 22:07

## 2019-01-01 RX ADMIN — Medication 10 ML: at 16:11

## 2019-01-01 RX ADMIN — HEPARIN SODIUM 5000 UNITS: 5000 INJECTION INTRAVENOUS; SUBCUTANEOUS at 06:23

## 2019-01-01 RX ADMIN — HYDROCORTISONE SODIUM SUCCINATE 50 MG: 100 INJECTION, POWDER, FOR SOLUTION INTRAMUSCULAR; INTRAVENOUS at 10:00

## 2019-01-01 RX ADMIN — PIPERACILLIN SODIUM,TAZOBACTAM SODIUM 4.5 G: 4; .5 INJECTION, POWDER, FOR SOLUTION INTRAVENOUS at 08:51

## 2019-01-01 RX ADMIN — Medication 10 ML: at 14:00

## 2019-01-01 RX ADMIN — VANCOMYCIN HYDROCHLORIDE 2000 MG: 10 INJECTION, POWDER, LYOPHILIZED, FOR SOLUTION INTRAVENOUS at 16:02

## 2019-01-01 RX ADMIN — Medication 10 ML: at 06:16

## 2019-01-01 RX ADMIN — FAMOTIDINE 20 MG: 10 INJECTION INTRAVENOUS at 09:00

## 2019-01-01 RX ADMIN — SODIUM CHLORIDE 1000 ML: 900 INJECTION, SOLUTION INTRAVENOUS at 11:19

## 2019-01-01 RX ADMIN — BARIUM SULFATE 30 ML: 400 SUSPENSION ORAL at 13:14

## 2019-01-01 RX ADMIN — HYDROCORTISONE SODIUM SUCCINATE 50 MG: 100 INJECTION, POWDER, FOR SOLUTION INTRAMUSCULAR; INTRAVENOUS at 16:00

## 2019-01-01 RX ADMIN — PIPERACILLIN SODIUM,TAZOBACTAM SODIUM 4.5 G: 4; .5 INJECTION, POWDER, FOR SOLUTION INTRAVENOUS at 16:14

## 2019-01-01 RX ADMIN — HYDROCORTISONE SODIUM SUCCINATE 50 MG: 100 INJECTION, POWDER, FOR SOLUTION INTRAMUSCULAR; INTRAVENOUS at 03:48

## 2019-01-01 RX ADMIN — PIPERACILLIN AND TAZOBACTAM 3.38 G: 3; .375 INJECTION, POWDER, FOR SOLUTION INTRAVENOUS at 02:05

## 2019-01-01 RX ADMIN — HEPARIN SODIUM 5000 UNITS: 5000 INJECTION INTRAVENOUS; SUBCUTANEOUS at 22:24

## 2019-01-01 RX ADMIN — PIPERACILLIN SODIUM,TAZOBACTAM SODIUM 4.5 G: 4; .5 INJECTION, POWDER, FOR SOLUTION INTRAVENOUS at 15:00

## 2019-01-01 RX ADMIN — SODIUM CHLORIDE 150 ML/HR: 900 INJECTION, SOLUTION INTRAVENOUS at 13:42

## 2019-01-01 RX ADMIN — HEPARIN SODIUM 5000 UNITS: 5000 INJECTION INTRAVENOUS; SUBCUTANEOUS at 06:01

## 2019-01-01 RX ADMIN — BARIUM SULFATE 30 G: 0.6 CREAM ORAL at 13:13

## 2019-01-01 RX ADMIN — PIPERACILLIN SODIUM,TAZOBACTAM SODIUM 4.5 G: 4; .5 INJECTION, POWDER, FOR SOLUTION INTRAVENOUS at 08:27

## 2019-01-01 RX ADMIN — PIPERACILLIN SODIUM,TAZOBACTAM SODIUM 4.5 G: 4; .5 INJECTION, POWDER, FOR SOLUTION INTRAVENOUS at 15:02

## 2019-01-01 RX ADMIN — PIPERACILLIN SODIUM,TAZOBACTAM SODIUM 4.5 G: 4; .5 INJECTION, POWDER, FOR SOLUTION INTRAVENOUS at 08:12

## 2019-01-01 RX ADMIN — HYDROCORTISONE SODIUM SUCCINATE 50 MG: 100 INJECTION, POWDER, FOR SOLUTION INTRAMUSCULAR; INTRAVENOUS at 04:00

## 2019-01-01 RX ADMIN — HEPARIN SODIUM 5000 UNITS: 5000 INJECTION INTRAVENOUS; SUBCUTANEOUS at 15:00

## 2019-01-01 RX ADMIN — Medication 10 ML: at 07:00

## 2019-01-01 RX ADMIN — VASOPRESSIN 0.03 UNITS/MIN: 20 INJECTION INTRAVENOUS at 03:35

## 2019-01-01 RX ADMIN — Medication 10 ML: at 15:03

## 2019-01-01 RX ADMIN — BARIUM SULFATE 30 G: 960 POWDER, FOR SUSPENSION ORAL at 13:14

## 2019-01-01 RX ADMIN — HEPARIN SODIUM 5000 UNITS: 5000 INJECTION INTRAVENOUS; SUBCUTANEOUS at 22:07

## 2019-01-01 RX ADMIN — HYDROCORTISONE SODIUM SUCCINATE 50 MG: 100 INJECTION, POWDER, FOR SOLUTION INTRAMUSCULAR; INTRAVENOUS at 22:00

## 2019-01-01 RX ADMIN — Medication 10 ML: at 21:58

## 2019-01-01 RX ADMIN — PIPERACILLIN SODIUM,TAZOBACTAM SODIUM 4.5 G: 4; .5 INJECTION, POWDER, FOR SOLUTION INTRAVENOUS at 02:54

## 2019-01-01 RX ADMIN — IPRATROPIUM BROMIDE AND ALBUTEROL SULFATE 3 ML: .5; 3 SOLUTION RESPIRATORY (INHALATION) at 21:07

## 2019-01-01 RX ADMIN — DOPAMINE HYDROCHLORIDE IN DEXTROSE 10 MCG/KG/MIN: 1.6 INJECTION, SOLUTION INTRAVENOUS at 13:37

## 2019-01-01 RX ADMIN — SODIUM CHLORIDE 1000 ML: 900 INJECTION, SOLUTION INTRAVENOUS at 12:23

## 2019-01-01 RX ADMIN — HYDROCORTISONE SODIUM SUCCINATE 50 MG: 100 INJECTION, POWDER, FOR SOLUTION INTRAMUSCULAR; INTRAVENOUS at 08:50

## 2019-01-01 RX ADMIN — HEPARIN SODIUM 5000 UNITS: 5000 INJECTION INTRAVENOUS; SUBCUTANEOUS at 06:16

## 2019-01-01 RX ADMIN — HEPARIN SODIUM 5000 UNITS: 5000 INJECTION INTRAVENOUS; SUBCUTANEOUS at 16:15

## 2019-01-01 RX ADMIN — Medication 12 MCG/MIN: at 14:55

## 2019-01-01 RX ADMIN — Medication 10 ML: at 21:30

## 2019-01-01 RX ADMIN — ALBUMIN (HUMAN) 25 G: 12.5 INJECTION, SOLUTION INTRAVENOUS at 02:05

## 2019-01-01 RX ADMIN — POTASSIUM CHLORIDE 10 MEQ: 10 INJECTION, SOLUTION INTRAVENOUS at 07:59

## 2019-01-01 RX ADMIN — PIPERACILLIN AND TAZOBACTAM 3.38 G: 3; .375 INJECTION, POWDER, FOR SOLUTION INTRAVENOUS at 16:05

## 2019-01-01 RX ADMIN — HYDROCORTISONE SODIUM SUCCINATE 50 MG: 100 INJECTION, POWDER, FOR SOLUTION INTRAMUSCULAR; INTRAVENOUS at 15:00

## 2019-01-01 RX ADMIN — HEPARIN SODIUM 5000 UNITS: 5000 INJECTION INTRAVENOUS; SUBCUTANEOUS at 23:14

## 2019-01-01 RX ADMIN — BUDESONIDE 500 MCG: 0.5 INHALANT RESPIRATORY (INHALATION) at 21:34

## 2019-01-01 RX ADMIN — HYDROCORTISONE SODIUM SUCCINATE 50 MG: 100 INJECTION, POWDER, FOR SOLUTION INTRAMUSCULAR; INTRAVENOUS at 21:30

## 2019-01-01 RX ADMIN — VASOPRESSIN 0.03 UNITS/MIN: 20 INJECTION INTRAVENOUS at 16:00

## 2019-01-01 RX ADMIN — SODIUM CHLORIDE, SODIUM ACETATE ANHYDROUS, SODIUM GLUCONATE, POTASSIUM CHLORIDE, AND MAGNESIUM CHLORIDE: 526; 222; 502; 37; 30 INJECTION, SOLUTION INTRAVENOUS at 07:57

## 2019-01-01 RX ADMIN — PIPERACILLIN SODIUM,TAZOBACTAM SODIUM 4.5 G: 4; .5 INJECTION, POWDER, FOR SOLUTION INTRAVENOUS at 03:10

## 2019-01-01 RX ADMIN — PIPERACILLIN SODIUM,TAZOBACTAM SODIUM 4.5 G: 4; .5 INJECTION, POWDER, FOR SOLUTION INTRAVENOUS at 21:57

## 2019-01-01 RX ADMIN — Medication 10 ML: at 06:26

## 2019-01-01 RX ADMIN — BUDESONIDE 500 MCG: 0.5 INHALANT RESPIRATORY (INHALATION) at 08:51

## 2019-01-01 RX ADMIN — HYDROCORTISONE SODIUM SUCCINATE 50 MG: 100 INJECTION, POWDER, FOR SOLUTION INTRAMUSCULAR; INTRAVENOUS at 21:56

## 2019-01-01 RX ADMIN — HEPARIN SODIUM 5000 UNITS: 5000 INJECTION INTRAVENOUS; SUBCUTANEOUS at 14:37

## 2019-01-01 RX ADMIN — Medication 10 ML: at 22:00

## 2019-01-01 RX ADMIN — IPRATROPIUM BROMIDE AND ALBUTEROL SULFATE 3 ML: .5; 3 SOLUTION RESPIRATORY (INHALATION) at 10:22

## 2019-01-01 RX ADMIN — DEXTROSE MONOHYDRATE 50 ML/HR: 5 INJECTION, SOLUTION INTRAVENOUS at 09:18

## 2019-01-01 RX ADMIN — HYDROCORTISONE SODIUM SUCCINATE 50 MG: 100 INJECTION, POWDER, FOR SOLUTION INTRAMUSCULAR; INTRAVENOUS at 16:15

## 2019-01-01 RX ADMIN — HEPARIN SODIUM 5000 UNITS: 5000 INJECTION INTRAVENOUS; SUBCUTANEOUS at 16:06

## 2019-01-01 RX ADMIN — HEPARIN SODIUM 5000 UNITS: 5000 INJECTION INTRAVENOUS; SUBCUTANEOUS at 07:50

## 2019-01-01 RX ADMIN — SODIUM CHLORIDE, SODIUM ACETATE ANHYDROUS, SODIUM GLUCONATE, POTASSIUM CHLORIDE, AND MAGNESIUM CHLORIDE: 526; 222; 502; 37; 30 INJECTION, SOLUTION INTRAVENOUS at 01:06

## 2019-01-01 RX ADMIN — PIPERACILLIN SODIUM,TAZOBACTAM SODIUM 4.5 G: 4; .5 INJECTION, POWDER, FOR SOLUTION INTRAVENOUS at 09:13

## 2019-01-01 RX ADMIN — Medication 10 ML: at 16:16

## 2019-03-28 PROBLEM — R57.9 SHOCK (HCC): Status: ACTIVE | Noted: 2019-01-01

## 2019-03-28 PROBLEM — R65.21 SEPTIC SHOCK (HCC): Status: ACTIVE | Noted: 2019-01-01

## 2019-03-28 PROBLEM — A41.9 SEPTIC SHOCK (HCC): Status: ACTIVE | Noted: 2019-01-01

## 2019-03-28 NOTE — ROUTINE PROCESS
TRANSFER - OUT REPORT: 
 
Verbal report given to Nola Venegas RN (name) on [facility]  being transferred to [facility] room 306 (unit) for routine progression of care Report consisted of patients Situation, Background, Assessment and  
Recommendations(SBAR). Information from the following report(s) SBAR was reviewed with the receiving nurse. Lines:  
Venous Access Device 03/28/19 Other (comment) (Active) Peripheral IV 03/28/19 Right Wrist (Active) Site Assessment Clean, dry, & intact 3/28/2019 10:47 AM  
Phlebitis Assessment 0 3/28/2019 10:47 AM  
Infiltration Assessment 0 3/28/2019 10:47 AM  
Dressing Status Clean, dry, & intact 3/28/2019 10:47 AM  
Dressing Type Transparent 3/28/2019 10:47 AM  
Hub Color/Line Status Green 3/28/2019 10:47 AM  
   
Peripheral IV 03/28/19 Left Forearm (Active) Site Assessment Clean, dry, & intact 3/28/2019 11:34 AM  
Phlebitis Assessment 0 3/28/2019 11:34 AM  
Infiltration Assessment 0 3/28/2019 11:34 AM  
Dressing Status Clean, dry, & intact 3/28/2019 11:34 AM  
Dressing Type Tape;Transparent 3/28/2019 11:34 AM  
Hub Color/Line Status Blue;Flushed 3/28/2019 11:34 AM  
Action Taken Blood drawn 3/28/2019 11:34 AM  
  
 
Opportunity for questions and clarification was provided. Patient transported with: 
 Monitor, oxygen, IV medication

## 2019-03-28 NOTE — ED NOTES
Patient rolled and cleansed, stool noted to diaper. New diaper placed and straight cath performed for urine sample. Blanchable redness noted to right buttock. Wife states, Dawn Pittman never wants to lay on his sides\".

## 2019-03-28 NOTE — H&P
[facility] Pulmonary, Critical Care, and Sleep Medicine Name: Mehrdad Malik MRN: [id number] : 1931 Hospital: [address] [place] [address] Date: 3/28/2019 Critical Care History & Physical 
 
 
IMPRESSION:  
· Shock, possibly vasodilatory +/- hypovolemic, need to r/o underlying sepsis · Acute on chronic encephalopathy, likely metabolic in setting of advanced dementia · Systemic inflammatory response with leukocytosis, tachycardia, elevated initial lactate · Acute kidney injury · Abnormal UA with large LE, pyuria, bacteriuria · Mildly reduced EF 50-55% on echo (2016) · Dementia -baseline mental status: few intermittent words, bed-bound · Nursing home patient Patient Active Problem List  
Diagnosis Code  Hypotension, unspecified I95.9  Dehydration E86.0  Alzheimer's dementia G30.9, F02.80  
 COPD (chronic obstructive pulmonary disease) ([facility]) J44.9  
 HTN (hypertension) I10  
 Septic shock ([facility]) A41.9, R65.21  Shock ([address] R57.9 RECOMMENDATIONS:  
· Resp:  May continue with oxygen supplementation NC -titrate to keep SpO2 > 90% · I/D: obtain additional set of blood culture, urine culture, PCR Influenza, urine Strep, urine Legionella; repeat lactic acid. Received ceftriaxone in ED. Start vancomycin, zosyn for possible aspiration +/- HAP. Start stress dose IV steroid · Hem/Onc: stable H/H, platelets. Obtain coags in am 
· CVS: actively wean vasopressor support, target MAP at least 65. Add vasopressin if needed. Serial cardiac enzymes. Gentle hydration given age and risk for fluid overload given mildly depressed EF 
· Metabolic: repeat CMP now -replace electrolytes cautiously given MARIUM · Renal: trend renal indices - bladder scan at 8p and repeat q6-8 hours -if > 300 mL, straight cath x 2 times. If on 3rd bladder scan UO is still > 300m, may insert nicholson catheter · Endocrine: obtain TSH.  Frequent glucose checks while NPO 
 · GI: NPO. Consider SLP tomorrow with improvement in mental status · Musc/Skin: stable · Neuro: monitor for worsening mentation · Code Status: DNR/DNI. Has a DDNR and a [facility]. Discussed with wife extensively the POST form that was signed in October 2018. Explained to the wife that the patient's presentation may be a manifestation of his dying process, and with respecting prior discussion about him not suffering through, will revisit his condition in 24-48 hours. If no improvement is noted then wife may likely proceed with comfort measures +/- Hospice. Best practice: · Sepsis Bundle per Hospital Protocol · Glycemic control · IHI ICU Bundles: 
·  Central Line Bundle Followed · Mech Vent patients/ Pulmonary pts:  
· VAP bundle, Aim to keep peak plateau pressure 71-24VX H2O 
· Daily sedation holiday as indicated · SBT as tolerated/appropriate · Stress ulcer prophylaxis. Pepcid · DVT prophylaxis. Heparin SQ 
· Need for Lines, nicholson assessed. Subjective/History: This patient has been seen and evaluated at the request of Dr. Kimmie Chawla for shock. 03/28/19 Patient is a 80 y.o. male with a medical history of dementia Parkinson's disease bedbound and from nursing home, brought to the ED for lethargy and hypotension. Per wife, she noted the patient to be lethargic this morning. At baseline she states that he can say a few words and intermittently interactive however he is bedbound. In the ED, patient was found to be tachycardic and hypotensive. Initial workup showed elevated lactic acid, leukocytosis, elevated creatinine. Severe sepsis bundle was initiated, blood culture obtained, and patient received 3 L normal saline. Patient was also given a load of ceftriaxone IV. Patient is evaluated at bedside in ICU, where he is responsive to painful stimulation and to loud name calling.   On arrival to ICU, it appears patient had some vomitus on his mouth, however the wife stated that this may be from his orange juice which he had this morning. Patient was last admitted in the hospital in October 2018 and the wife had signed a [place] post back then which states that patient is a DNR, comfort measures only, and no feeding tubes. Clarified this with wife and she would still likely pursue comfort measures and/or hospice if the patient does not have any meaningful improvement in the next 2448 hrs. The patient is critically ill and can not provide additional history due to altered mental status. Past Medical History:  
Diagnosis Date  Bowel obstruction ([facility]  Cancer [facility])   
 colon  Chronic obstructive pulmonary disease ([facility]  Dementia  Hyperlipidemia  Hypertension  Muscle weakness  [facility]'s disease ([facility] Past Surgical History:  
Procedure Laterality Date  HX GI    
 colon cancer  HX HEENT    
 left eye cataract Prior to Admission medications Medication Sig Start Date End Date Taking? Authorizing Provider  
acetaminophen (TYLENOL) 325 mg tablet Take 650 mg by mouth every four (4) hours as needed for Pain. Javi Calle MD  
aspirin-dipyridamole (AGGRENOX)  mg per SR capsule Take 1 Cap by mouth two (2) times a day. Javi Calle MD  
carbidopa-levodopa CR (SINEMET CR)  mg per tablet Take 1 Tab by mouth two (2) times a day. Javi Calle MD  
tamsulosin (FLOMAX) 0.4 mg capsule Take 0.4 mg by mouth daily. Javi Calle MD  
fluticasone-salmeterol (ADVAIR HFA) 115-21 mcg/actuation inhaler Take 2 Puffs by inhalation two (2) times a day. Javi Calle MD  
midodrine (PROAMITINE) 5 mg tablet Take 5 mg by mouth daily. Javi Calle MD  
magnesium hydroxide (MILK OF MAGNESIA) 400 mg/5 mL suspension Take 30 mL by mouth daily as needed for Constipation. Javi Calle MD  
rasagiline (AZILECT) 1 mg tablet Take 1 mg by mouth daily.     Javi Calle MD  
 mirtazapine (REMERON) 15 mg tablet Take 15 mg by mouth nightly. Javi Calle MD  
rivastigmine (EXELON) 13.3 mg/24 hour patch 1 Patch by TransDERmal route daily. Javi Calle MD  
therapeutic multivitamin (THERA) tablet Take 1 Tab by mouth daily. Javi Calle MD  
triamcinolone (ARISTOCORT) 0.5 % topical cream Apply  to affected area two (2) times a day. use thin layer    Javi Calle MD  
simvastatin (ZOCOR) 40 mg tablet Take  by mouth nightly. Javi Calle MD  
 
 
Current Facility-Administered Medications Medication Dose Route Frequency  electrolyte-r (NORMOSOL R) infusion   IntraVENous CONTINUOUS  
 sodium chloride (NS) flush 5-40 mL  5-40 mL IntraVENous Q8H  
 NOREPINephrine (LEVOPHED) 8 mg in 5% dextrose 250mL infusion  2-30 mcg/min IntraVENous TITRATE  [START ON 3/29/2019] famotidine (PF) (PEPCID) 20 mg in sodium chloride 0.9% 10 mL injection  20 mg IntraVENous DAILY  piperacillin-tazobactam (ZOSYN) 3.375 g in 0.9% sodium chloride (MBP/ADV) 100 mL MBP  3.375 g IntraVENous Q6H  
 heparin (porcine) injection 5,000 Units  5,000 Units SubCUTAneous Q8H  
 vancomycin (VANCOCIN) 2000 mg in  ml infusion  2,000 mg IntraVENous ONCE  
 VANCOMYCIN INFORMATION NOTE   Other CONTINUOUS  
 hydrocortisone Sod Succ (PF) (SOLU-CORTEF) injection 50 mg  50 mg IntraVENous Q6H  
 vasopressin (VASOSTRICT) 20 Units in 0.9% sodium chloride 100 mL infusion  0.03 Units/min IntraVENous CONTINUOUS No Known Allergies Social History Tobacco Use  Smoking status: Unknown If Ever Smoked  Smokeless tobacco: Never Used Substance Use Topics  Alcohol use: Not Currently History reviewed. No pertinent family history. Review of Systems: 
Review of systems not obtained due to patient factors. Objective: 
Vital Signs:   
Visit Vitals BP 96/61 Pulse 75 Temp 97.6 °F (36.4 °C) Resp 17 Ht 6' 0.01\" (1.829 m) Wt 81.6 kg (180 lb) SpO2 99% BMI 24.41 kg/m² O2 Device: Nasal cannula O2 Flow Rate (L/min): 2 l/min Temp (24hrs), Av.9 °F (37.2 °C), Min:97.6 °F (36.4 °C), Max:100.3 °F (37.9 °C) Intake/Output:  
Last shift:       0701 -  1900 In:  [I.V.:2000] Out: - Last 3 shifts: No intake/output data recorded. Intake/Output Summary (Last 24 hours) at 3/28/2019 1713 Last data filed at 3/28/2019 1335 Gross per 24 hour Intake 2000 ml Output  Net 2000 ml Physical Exam:  
 
General:  Opens eyes and moans to painful stimulation and loud name calling Head:  Normocephalic, without obvious abnormality, atraumatic. Eyes:  Pink palpebral conjunctivae, anicteric sclerae Nose: Nares normal. No drainage Throat: Lips, mucosa, and tongue dry. Dried yellow liquid inside oral area. Neck: Supple, symmetrical, trachea midline, no adenopathy, no carotid bruit and no JVD. Lungs:   Symmetrical chest rise; good AE bilat; CTAB; no wheezes/rhonchi/rales noted. Heart:  Tachycardic rate Abdomen:   Soft, non-tender. Bowel sounds normal.   
Extremities: Extremities normal, atraumatic, no cyanosis or edema. Pulses: 2+ and symmetric all extremities. Skin: Skin cool to touch Neurologic: Grossly nonfocal 
  
 
 
Data:  
 
Recent Results (from the past 24 hour(s)) EKG, 12 LEAD, INITIAL Collection Time: 19 10:40 AM  
Result Value Ref Range Ventricular Rate 120 BPM  
 Atrial Rate 94 BPM  
 QRS Duration 96 ms  
 Q-T Interval 350 ms QTC Calculation (Bezet) 494 ms Calculated R Axis -31 degrees Calculated T Axis -173 degrees Diagnosis Undetermined rhythm Left axis deviation Posterior infarct , possibly acute ST & T wave abnormality, consider inferolateral ischemia ACUTE MI 
Abnormal ECG When compared with ECG of 2016 09:51, 
Current undetermined rhythm precludes rhythm comparison, needs review ST now depressed in Anterior leads T wave inversion now evident in Inferior leads T wave inversion now evident in Anterolateral leads POC LACTIC ACID Collection Time: 03/28/19 10:50 AM  
Result Value Ref Range Lactic Acid (POC) 3.16 (HH) 0.40 - 2.00 mmol/L METABOLIC PANEL, COMPREHENSIVE Collection Time: 03/28/19 11:00 AM  
Result Value Ref Range Sodium 143 136 - 145 mmol/L Potassium 4.4 3.5 - 5.5 mmol/L Chloride 107 100 - 108 mmol/L  
 CO2 22 21 - 32 mmol/L Anion gap 14 3.0 - 18 mmol/L Glucose 134 (H) 74 - 99 mg/dL BUN 31 (H) 7.0 - 18 MG/DL Creatinine 1.84 (H) 0.6 - 1.3 MG/DL  
 BUN/Creatinine ratio 17 12 - 20 GFR est AA 42 (L) >60 ml/min/1.73m2 GFR est non-AA 35 (L) >60 ml/min/1.73m2 Calcium 8.6 8.5 - 10.1 MG/DL Bilirubin, total 0.5 0.2 - 1.0 MG/DL  
 ALT (SGPT) 7 (L) 16 - 61 U/L  
 AST (SGOT) 16 15 - 37 U/L Alk. phosphatase 93 45 - 117 U/L Protein, total 7.9 6.4 - 8.2 g/dL Albumin 2.7 (L) 3.4 - 5.0 g/dL Globulin 5.2 (H) 2.0 - 4.0 g/dL A-G Ratio 0.5 (L) 0.8 - 1.7 URINALYSIS W/ RFLX MICROSCOPIC Collection Time: 03/28/19 11:06 AM  
Result Value Ref Range Color YELLOW Appearance CLOUDY Specific gravity 1.018 1.005 - 1.030    
 pH (UA) 5.0 5.0 - 8.0 Protein 30 (A) NEG mg/dL Glucose NEGATIVE  NEG mg/dL Ketone NEGATIVE  NEG mg/dL Bilirubin NEGATIVE  NEG Blood TRACE (A) NEG Urobilinogen 0.2 0.2 - 1.0 EU/dL Nitrites NEGATIVE  NEG Leukocyte Esterase LARGE (A) NEG URINE MICROSCOPIC ONLY Collection Time: 03/28/19 11:06 AM  
Result Value Ref Range WBC 36 to 50 0 - 4 /hpf  
 RBC 0 to 3 0 - 5 /hpf Epithelial cells FEW 0 - 5 /lpf Bacteria FEW (A) NEG /hpf Mucus 1+ (A) NEG /lpf Amorphous Crystals 3+ (A) NEG  
CBC WITH AUTOMATED DIFF Collection Time: 03/28/19 11:30 AM  
Result Value Ref Range WBC 14.7 (H) 4.6 - 13.2 K/uL  
 RBC 3.86 (L) 4.70 - 5.50 M/uL  
 HGB 11.9 (L) 13.0 - 16.0 g/dL HCT 37.4 36.0 - 48.0 %  MCV 96.9 74.0 - 97.0 FL  
 MCH 30.8 24.0 - 34.0 PG  
 MCHC 31.8 31.0 - 37.0 g/dL  
 RDW 13.6 11.6 - 14.5 % PLATELET 252 939 - 086 K/uL MPV 10.0 9.2 - 11.8 FL  
 NEUTROPHILS 86 (H) 40 - 73 % LYMPHOCYTES 6 (L) 21 - 52 % MONOCYTES 8 3 - 10 % EOSINOPHILS 0 0 - 5 % BASOPHILS 0 0 - 2 %  
 ABS. NEUTROPHILS 12.6 (H) 1.8 - 8.0 K/UL  
 ABS. LYMPHOCYTES 0.9 0.9 - 3.6 K/UL  
 ABS. MONOCYTES 1.2 0.05 - 1.2 K/UL  
 ABS. EOSINOPHILS 0.0 0.0 - 0.4 K/UL  
 ABS. BASOPHILS 0.0 0.0 - 0.1 K/UL  
 DF AUTOMATED    
TROPONIN I Collection Time: 03/28/19 11:30 AM  
Result Value Ref Range Troponin-I, QT 0.06 (H) 0.0 - 0.045 NG/ML  
POC LACTIC ACID Collection Time: 03/28/19  1:41 PM  
Result Value Ref Range Lactic Acid (POC) 1.34 0.40 - 2.00 mmol/L  
TSH 3RD GENERATION Collection Time: 03/28/19  3:20 PM  
Result Value Ref Range TSH 1.43 0.36 - 3.74 uIU/mL AMMONIA Collection Time: 03/28/19  3:20 PM  
Result Value Ref Range Ammonia 20 11 - 32 UMOL/L  
SALICYLATE Collection Time: 03/28/19  3:20 PM  
Result Value Ref Range Salicylate level <8.7 (L) 2.8 - 20.0 MG/DL  
ACETAMINOPHEN Collection Time: 03/28/19  3:20 PM  
Result Value Ref Range Acetaminophen level <2 (L) 10.0 - 30.0 ug/mL CARDIAC PANEL,(CK, CKMB & TROPONIN) Collection Time: 03/28/19  3:20 PM  
Result Value Ref Range CK 63 39 - 308 U/L  
 CK - MB 2.9 <3.6 ng/ml CK-MB Index 4.6 (H) 0.0 - 4.0 % Troponin-I, QT 0.30 (H) 0.0 - 0.045 NG/ML  
CBC WITH AUTOMATED DIFF Collection Time: 03/28/19  3:20 PM  
Result Value Ref Range WBC 14.9 (H) 4.6 - 13.2 K/uL  
 RBC 3.68 (L) 4.70 - 5.50 M/uL  
 HGB 11.2 (L) 13.0 - 16.0 g/dL HCT 35.0 (L) 36.0 - 48.0 % MCV 95.1 74.0 - 97.0 FL  
 MCH 30.4 24.0 - 34.0 PG  
 MCHC 32.0 31.0 - 37.0 g/dL  
 RDW 13.9 11.6 - 14.5 % PLATELET 742 328 - 845 K/uL MPV 10.4 9.2 - 11.8 FL  
 NEUTROPHILS 88 (H) 40 - 73 % LYMPHOCYTES 8 (L) 21 - 52 % MONOCYTES 4 3 - 10 % EOSINOPHILS 0 0 - 5 % BASOPHILS 0 0 - 2 % ABS. NEUTROPHILS 13.1 (H) 1.8 - 8.0 K/UL  
 ABS. LYMPHOCYTES 1.2 0.9 - 3.6 K/UL  
 ABS. MONOCYTES 0.6 0.05 - 1.2 K/UL  
 ABS. EOSINOPHILS 0.0 0.0 - 0.4 K/UL  
 ABS. BASOPHILS 0.0 0.0 - 0.1 K/UL  
 DF AUTOMATED METABOLIC PANEL, COMPREHENSIVE Collection Time: 03/28/19  3:20 PM  
Result Value Ref Range Sodium 143 136 - 145 mmol/L Potassium 4.0 3.5 - 5.5 mmol/L Chloride 112 (H) 100 - 108 mmol/L  
 CO2 21 21 - 32 mmol/L Anion gap 10 3.0 - 18 mmol/L Glucose 170 (H) 74 - 99 mg/dL BUN 30 (H) 7.0 - 18 MG/DL Creatinine 1.58 (H) 0.6 - 1.3 MG/DL  
 BUN/Creatinine ratio 19 12 - 20 GFR est AA 51 (L) >60 ml/min/1.73m2 GFR est non-AA 42 (L) >60 ml/min/1.73m2 Calcium 7.7 (L) 8.5 - 10.1 MG/DL Bilirubin, total 0.4 0.2 - 1.0 MG/DL  
 ALT (SGPT) 6 (L) 16 - 61 U/L  
 AST (SGOT) 14 (L) 15 - 37 U/L Alk. phosphatase 87 45 - 117 U/L Protein, total 6.4 6.4 - 8.2 g/dL Albumin 2.5 (L) 3.4 - 5.0 g/dL Globulin 3.9 2.0 - 4.0 g/dL A-G Ratio 0.6 (L) 0.8 - 1.7 MAGNESIUM Collection Time: 03/28/19  3:20 PM  
Result Value Ref Range Magnesium 2.0 1.6 - 2.6 mg/dL PHOSPHORUS Collection Time: 03/28/19  3:20 PM  
Result Value Ref Range Phosphorus 4.7 2.5 - 4.9 MG/DL  
LACTIC ACID Collection Time: 03/28/19  3:20 PM  
Result Value Ref Range Lactic acid 2.0 0.4 - 2.0 MMOL/L No results for input(s): FIO2I, IFO2, HCO3I, IHCO3, HCOPOC, PCO2I, PCOPOC, IPHI, PHI, PHPOC, PO2I, PO2POC in the last 72 hours. No lab exists for component: IPOC2 Telemetry:sinus tachycardia Imaging: 
I have personally reviewed the patients radiographs and have reviewed the reports: 
 
CXR [date]: CXR Results  (Last 48 hours) 03/28/19 1459  XR CHEST PORT Final result Impression:  IMPRESSION: Left central catheter tip overlying SVC entrance. No pneumothorax. More focal infiltrate/atelectasis in the right medial lung base. No CHF.   
  
 Narrative:  Portable CXR:  
   
 Time stamped: 1440 hours COMPARISON: 1059 hours HISTORY: Central line placement. Left IJ catheter crosses the midline, tip at the proximal SVC entrance. No  
pneumothorax. Increasing density in the right medial lung base. Cardiomegaly with ectatic  
aorta as before. No vascular congestion. No pleural effusion. 03/28/19 1113  XR CHEST PORT Final result Impression:  IMPRESSION: Limited study. No vascular congestion or significant consolidation. Small right pleural effusion. Narrative:  Portable CXR:  
   
Comparison November 29, 2016 HISTORY: Hypotension and lethargy. Mild cardiomegaly, exaggerated by rotation. Also ectatic aorta, perhaps stable. No vascular congestion. Small right pleural effusion. Mild atelectasis in the  
left lung base. No definite consolidation. CT HEAD/CHEST/ABD/PELVIS [date]: CT Results  (Last 48 hours) None Total of 60 min critical care time spent at bedside during the course of care providing evaluation,management and care decisions and ordering appropriate treatment related to critical care problems exclusive of procedures. The reason for providing this level of medical care for this critically ill patient was due a critical illness that impaired one or more vital organ systems such that there was a high probability of imminent or life threatening deterioration in the patients condition. This care involved high complexity decision making to assess, manipulate, and support vital system functions, to treat this degree vital organ system failure and to prevent further life threatening deterioration of the patients condition.  
 
Shamar Harmon PA-C

## 2019-03-28 NOTE — ED NOTES
Report given to [facility]. Wife at bedside. Patient more alert, opening eyes and asking to be \"covered up\". Blankets provided. Will continue to monitor closely.

## 2019-03-28 NOTE — ED PROVIDER NOTES
HPI patient is brought to the emergency department today from local nursing home by EMS. Patient is unable to provide any history due to his mental status and clinical condition. History is provided by patient's family member she states that this morning the patient has been lethargic and not talking. The family and family member saw the patient yesterday when he was awake and talking. Family member says patient has a baseline dementia but that yesterday he was not having any specific complaints and appeared at his baseline health. This morning the nursing home noted that patient was hypotensive lethargic with a low-grade fever. At this point the patient was transported to the ER by EMS. No further history information is available at this time. Past Medical History:  
Diagnosis Date  Bowel obstruction ([facility]  Cancer [facility])   
 colon  Chronic obstructive pulmonary disease ([facility]  Dementia  Hyperlipidemia  Hypertension  Muscle weakness  Parkinson's disease ([facility] Past Surgical History:  
Procedure Laterality Date  HX GI    
 colon cancer  HX HEENT    
 left eye cataract History reviewed. No pertinent family history. Social History Socioeconomic History  Marital status: UNKNOWN Spouse name: Not on file  Number of children: Not on file  Years of education: Not on file  Highest education level: Not on file Occupational History  Not on file Social Needs  Financial resource strain: Not on file  Food insecurity:  
  Worry: Not on file Inability: Not on file  Transportation needs:  
  Medical: Not on file Non-medical: Not on file Tobacco Use  Smoking status: Unknown If Ever Smoked  Smokeless tobacco: Never Used Substance and Sexual Activity  Alcohol use: Not Currently  Drug use: Not Currently  Sexual activity: Not on file Lifestyle  Physical activity:  
  Days per week: Not on file Minutes per session: Not on file  Stress: Not on file Relationships  Social connections:  
  Talks on phone: Not on file Gets together: Not on file Attends Baptism service: Not on file Active member of club or organization: Not on file Attends meetings of clubs or organizations: Not on file Relationship status: Not on file  Intimate partner violence:  
  Fear of current or ex partner: Not on file Emotionally abused: Not on file Physically abused: Not on file Forced sexual activity: Not on file Other Topics Concern  Not on file Social History Narrative  Not on file ALLERGIES: Patient has no known allergies. Review of Systems Unable to perform ROS: Dementia Vitals:  
 03/28/19 1049 03/28/19 1103 03/28/19 1107 03/28/19 1108 BP:   (!) 63/49 (!) 63/49 Pulse:   (!) 117 (!) 113 Resp:   26 26 Temp:    100.3 °F (37.9 °C) SpO2: 98%  98% 98% Weight:  81.6 kg (180 lb) Physical Exam  
Constitutional: He appears well-developed. Chronically ill-appearing patient lying on the stretcher responds to painful stimulus, with normal respiratory effort. HENT:  
Head: Normocephalic and atraumatic. Dry lips with moist mucous membranes Eyes: Pupils are equal, round, and reactive to light. EOM are normal.  
Neck: Neck supple. Cardiovascular: Regular rhythm. Heart sounds are tachycardic and regular Pulmonary/Chest: Effort normal.  
Decreased breath sounds at the lung bases bilaterally Abdominal: Soft. He exhibits no distension. There is no tenderness. There is no guarding. Musculoskeletal: Normal range of motion. Patient has peripheral muscle wasting Neurological: It is somnolent and responds to painful stimulus. Will open his eyes spontaneously and look around. Patient is nonverbal.  
Skin: Skin is warm and dry. Nursing note and vitals reviewed. MDM Number of Diagnoses or Management Options Septic shock [facility]):  
 patient has severe sepsis with presumable origin being urinary tract. I have arranged admission to the intensive care unit at [facility]. Patient's family confirms that he is a DNR status. Initiate inotrope medication while in the emergency department and will also start him on a central line. Procedures A left internal jugular central line was placed using sterile process and the Seldinger technique. Line was placed on the first past with ultrasound guidance. Good blood flow was returned. Patient tolerated the procedure well with no apparent complications. CRITICAL CARE: 
1:32 PM 
I have spent 90 minutes of critical care time involved in lab review, consultations with specialist, family decision-making, and documentation. During this entire length of time I was immediately available to the patient. Critical Care: The reason for providing this level of medical care for this critically ill patient was due a critical illness that impaired one or more vital organ systems such that there was a high probability of imminent or life threatening deterioration in the patients condition. This care involved high complexity decision making to assess, manipulate, and support vital system functions, to treat this degreee vital organ system failure and to prevent further life threatening deterioration of the patients condition.

## 2019-03-29 NOTE — PROGRESS NOTES
Speech Pathology Screen 1107- 1126:  Noted new order for swallow eval, chart reviewed, spoke with nurse and pt wife. Pt with h/o dysphagia, participated in [facility] 12/2015 with silent penetration of straw sips thin and with pill swallow liquid wash. Recent CXR 3/28/19 \"More focal infiltrate/atelectasis in the right medial lung base\". Given previous, pt would benefit from repeat MBS to r/o silent penetration/ aspiration and to determine current swallow function. Pt currently lethargic, unable to arouse also on pressors, unable to leave unit to have study completed this date. Will f/u Monday to see if pt able to leave floor. Notified nurse and wife to ice chips PRN for oral care/ comfort upon pt request.  SLP will continue to follow. Thank you for this referral, 
Julisa Rivera MS, CCC/SLP Speech- Language Pathologist

## 2019-03-29 NOTE — PROGRESS NOTES
Reason for Admission:   Septic shock RRAT Score:     17 Do you (patient/family) have any concerns for transition/discharge? Plan for utilizing home health:   no 
 
Current Advanced Directive/Advance Care Plan:  yes Likelihood of readmission? Mod/yellow Transition of Care Plan: This patient is a resident of [facility] per patient's wife Nena Lopes [phone] and the plan is for him to return there. He has been on comfort measures. Palliative has seen the patient today. Patient's wife is open to Hospice. Care Management Interventions PCP Verified by CM: Yes 
Palliative Care Criteria Met (RRAT>21 & CHF Dx)?: No 
Mode of Transport at Discharge: Other (see comment)(medical transport) Transition of Care Consult (CM Consult): [facility] Current Support Network: [address] [place] [address] Confirm Follow Up Transport: Other (see comment)(medical transport) Plan discussed with Pt/Family/Caregiver: (ICU treatment team) [employer] Information Provided?: No 
Discharge Location Discharge Placement: [facility] Joanne Pitts RN, BSN  [phone]

## 2019-03-29 NOTE — ROUTINE PROCESS
Bedside and Verbal shift change report given to Bibi Chicas RN (oncoming nurse) by Dawna David RN (offgoing nurse).  Report included the following information SBAR, Intake/Output, MAR, Recent Results and Cardiac Rhythm Junctional.

## 2019-03-29 NOTE — PROGRESS NOTES
Bedside and Verbal shift change report given to Maximino Essex, RN (oncoming nurse) by Svetlana Traylor 
 (offgoing nurse). Report included the following information SBAR, Kardex, ED Summary, Procedure Summary, Intake/Output, MAR, Recent Results, Med Rec Status and Alarm Parameters . Visit Vitals /69 Pulse 61 Temp 97.8 °F (36.6 °C) Resp 17 Ht 6' 0.01\" (1.829 m) Wt 81.6 kg (180 lb) SpO2 100% BMI 24.41 kg/m²

## 2019-03-29 NOTE — PROGRESS NOTES
NUTRITION Nutrition Screen RECOMMENDATIONS / PLAN:  
 
- Monitor readiness for diet initiation. Provide nutrition interventions consistent with goals of care. - Continue IV fluid as medically appropriate.  
- Continue RD inpatient monitoring and evaluation. NUTRITION INTERVENTIONS & DIAGNOSIS:  
 
[] Meals/snacks: modify composition, NPO pending swallow evaluation  
[x] IV fluid: Normosol at 100 mL/hr  
[x] Collaboration and referral of nutrition care: interdisciplinary rounds Nutrition Diagnosis: Inadequate oral intake related to altered mentation, dementia and dysphagia as evidenced by pt NPO. ASSESSMENT:  
 
Admitted from nursing home for sepsis, increasingly lethargic with hx of dementia and dysphagia. SLP consulted and recommend MBS prior to initiating diet- too unstable to tolerate today with plan for follow up Monday. Noted plan for possible transition to hospice versus comfort measures, family does not wish to have feeding tube placed. Average po intake adequate to meet patients estimated nutritional needs:   [] Yes     [x] No   [] Unable to determine at this time Diet: DIET NPO Food Allergies: NKFA Current Appetite:   [] Good     [] Fair     [] Poor     [x] Other: NPO, AMS Appetite/meal intake prior to admission:   [] Good     [] Fair     [] Poor     [x] Other: poor meal intake with difficulty tolerating soft solids, improved over the past week PTA once downgraded to pureed diet Feeding Limitations:  [x] Swallowing difficulty: SLP following- MBS planned    [] Chewing difficulty    [x] Other: hx of dysphagia, previously on soft solid diet at nursing home then downgraded to pureed diet a week PTA with improved tolerance per wife Current Meal Intake: No data found. BM: PTA Skin Integrity: WDL Edema:   [x] No     [] Yes Pertinent Medications: Reviewed: Normosol at 100 mL/hr, steroid, levophed, vasopressin Recent Labs  
  03/29/19 
0352 03/28/19 
1520 03/28/19 1100  
 143 143  
K 4.0 4.0 4.4 * 112* 107 CO2 20* 21 22 * 170* 134* BUN 34* 30* 31* CREA 1.43* 1.58* 1.84* CA 7.6* 7.7* 8.6 MG 2.1 2.0  --   
PHOS 3.5 4.7  --   
ALB 2.5* 2.5* 2.7* SGOT 18 14* 16 ALT 8* 6* 7* Intake/Output Summary (Last 24 hours) at 3/29/2019 1228 Last data filed at 3/29/2019 0700 Gross per 24 hour Intake 3684.78 ml Output 445 ml Net 3239.78 ml Anthropometrics: 
Ht Readings from Last 1 Encounters:  
03/28/19 6' 0.01\" (1.829 m) Last 3 Recorded Weights in this Encounter 03/28/19 1103 03/28/19 1130 Weight: 81.6 kg (180 lb) 81.6 kg (180 lb) Body mass index is 24.41 kg/m². Weight History: wife at bedside reports patient appears to have lost weight over the past year based on observation- unsure of specific amount, previous weight over 200 lb Weight Metrics 3/28/2019 12/1/2016 Weight 180 lb 188 lb 1.6 oz  
BMI 24.41 kg/m2 25.51 kg/m2 Admitting Diagnosis: Septic shock ([facility] [A41.9, R65.21] Shock ([facility] [address] [R57.9] Pertinent PMHx: colon cancer, bowel obstruction, HLD, HTN, COPD, dementia Education Needs:        [x] None identified  [] Identified - Not appropriate at this time  []  Identified and addressed - refer to education log Learning Limitations:   [] None identified  [x] Identified: dementia, minimally responsive Cultural, Christianity & ethnic food preferences:  [x] None identified    [] Identified and addressed ESTIMATED NUTRITION NEEDS:  
 
Calories: 8423-9035 kcal (MSJx1.2-1.4) based on  [x] Actual BW 82 kg     [] IBW Protein: 66-98 gm (0.8-1.2 gm/kg) based on  [x] Actual BW      [] IBW Fluid: 1 mL/kcal 
  
MONITORING & EVALUATION:  
 
Nutrition Goal(s): 1. Provide nutrition intervention as appropriate with goals of care for the next 5-7 days.  Outcome:  [] Met/Ongoing    [] Progressing towards goal    [] Not progressing towards goal    [x] New/Initial goal   
 2. Nutritional needs will be met through adequate oral intake or nutrition support within the next 7 days. Outcome:  [] Met/Ongoing    [] Progressing towards goal    [] Not progressing towards goal    [x] New/Initial goal   
 
Monitoring:   [] Food and beverage intake   [x] Diet order   [x] Nutrition-focused physical findings   [x] Treatment/therapy   [] Weight   [] Enteral nutrition intake Previous Recommendations (for follow-up assessments only):     []   Implemented       []   Not Implemented (RD to address)      [] No Longer Appropriate     [] No Recommendation Made Discharge Planning: pending ability to tolerate oral diet and goals of care [x] Participated in care planning, discharge planning, & interdisciplinary rounds as appropriate Sisi Miller RD, [facility] Pager: [phone]

## 2019-03-29 NOTE — CONSULTS
Palliative Medicine Consult [facility]: [phone] ([phone]) [facility]: [phone] [facility]: [phone] Patient Name: Krystal Ganser YOB: 1931 Date of Initial Consult: 3/29/19 Reason for Consult: care decisions Requesting Provider: Jose Valadez Primary Care Physician: Reema Banegas MD 
  
 SUMMARY:  
Krystal Ganser is a 80y.o. year old with a past history of Alzheimer's dementia, COPD, HTN, who was admitted on 3/28/2019 from [facility] with a diagnosis of sepsis. Current medical issues leading to Palliative Medicine involvement include: 79 y/o nursing home resident with advanced dementia admitted to the ICU for sepsis. He is known to our service and we are consulted to discuss goals of care. PALLIATIVE DIAGNOSES:  
1. ACP/goals of care 2. Sepsis 3. Dementia 4. debility PLAN:  
1. ACP/goals of care: Met with patient and wife at bedside along with Allan Guerra NP. The patient is known to me from [facility]. He is intermittently alert, but not able to participate in conversation. His wife Dottie Barlow is his medical decision maker and previously signed a POST for DNR, comfort measures, no feeding tube. His wife describes that the patient was not acting like himself and was brought to the hospital. He is currently on 2 pressors for hypotension r/t sepsis. She asks us about hospice. She says she would like the patient to start hospice services once he returns to the facility. We discussed starting comfort measures her in the hospital, however ultimately the wife decided to continue current treatment including pressors, but no escalation of care.   She does not want the patient to have CPR, no intubation for any reason, and says that if the patient's hypotension were to worsen and require a third pressor to instead initiate comfort measures at that time. We will continue treatment to see if the patient improves over the weekend. The plan for now is return to the facility with hospice services in place once able. 2. Sepsis: likely urinary source, on pressors fo hypotension, antibiotics 3. Dementia: advanced, pt says only a few words, is bedbound 4. Debility: bedbound nursing home patient, if he survives hospitalization his wife wishes to start hospice upon return to the facility 5. Initial consult note routed to primary continuity provider 6. Communicated plan of care with: Palliative IDT 
 
GOALS OF CARE: 
Patient/Health Care Proxy Stated Goals: Prolong life TREATMENT PREFERENCES:  
Code Status: DNR Advance Care Planning: 
[x] [employer] Interdisciplinary Team has updated the ACP Navigator with [address] and Patient Capacity Primary Decision Maker (Health Care Agent): Abram Marc (wife) Medical Interventions: Limited additional interventions Other Instructions: do not intubate, do not add a third pressor, DNR Artificially Administered Nutrition: No feeding tube Other: As far as possible, the palliative care team has discussed with patient / health care proxy about goals of care / treatment preferences for patient. HISTORY:  
 
History obtained from: chart, wife CHIEF COMPLAINT: AMS 
 
HPI/SUBJECTIVE: The patient is:  
[] Verbal and participatory [x] Non-participatory due to:  
Advanced dementia Clinical Pain Assessment (nonverbal scale for nonverbal patients): Clinical Pain Assessment Severity: 0 Activity (Movement): Lying quietly, normal position Duration: for how long has pt been experiencing pain (e.g., 2 days, 1 month, years) Frequency: how often pain is an issue (e.g., several times per day, once every few days, constant) FUNCTIONAL ASSESSMENT:  
 
Palliative Performance Scale (PPS): PPS: 20 
 
ECOG 
ECOG Status : Completely disabled PSYCHOSOCIAL/SPIRITUAL SCREENING:  
  
Any spiritual / Cheondoism concerns: 
[] Yes /  [x] No 
 
Caregiver Burnout: 
[] Yes /  [x] No /  [] No Caregiver Present Anticipatory grief assessment:  
[x] Normal  / [] Maladaptive REVIEW OF SYSTEMS:  
 
Positive and pertinent negative findings in ROS are noted above in HPI. The following systems were [x] reviewed / [x] unable to be reviewed as noted in HPI Other findings are noted below. Systems: constitutional, ears/nose/mouth/throat, respiratory, gastrointestinal, genitourinary, musculoskeletal, integumentary, neurologic, psychiatric, endocrine. Positive findings noted below. Modified ESAS Completed by: provider Drowsiness: 6 Pain: 0 Dyspnea: 0 PHYSICAL EXAM:  
 
Wt Readings from Last 3 Encounters:  
03/28/19 81.6 kg (180 lb) 12/01/16 85.3 kg (188 lb 1.6 oz) Blood pressure 100/65, pulse (!) 48, temperature 97.8 °F (36.6 °C), resp. rate 14, height 6' 0.01\" (1.829 m), weight 81.6 kg (180 lb), SpO2 97 %. Pain: 
Pain Scale 1: Numeric (0 - 10) Pain Intensity 1: 0 Last bowel movement: none recorded Constitutional: elderly white man in bed, alert at times, says \"orange juice\" Eyes: pupils equal, anicteric ENMT: no nasal discharge, moist mucous membranes Respiratory: breathing not labored, symmetric Musculoskeletal: no deformity, no tenderness to palpation Skin: warm, dry Neurologic: not following commandsPsychiatric: full affect, no hallucinations Other: 
 
 
 HISTORY:  
 
Active Problems: 
  Septic shock ([address] (3/28/2019) Shock ([facility] [address] (3/28/2019) Past Medical History:  
Diagnosis Date  Bowel obstruction ([facility] [address]  Cancer [facility])   
 colon  Chronic obstructive pulmonary disease ([facility] [address]  Dementia  Hyperlipidemia  Hypertension  Muscle weakness  Parkinson's disease ([facility] [address] Past Surgical History:  
Procedure Laterality Date  HX GI    
 colon cancer  HX HEENT    
 left eye cataract History reviewed. No pertinent family history. History reviewed, no pertinent family history. Social History Tobacco Use  Smoking status: Unknown If Ever Smoked  Smokeless tobacco: Never Used Substance Use Topics  Alcohol use: Not Currently No Known Allergies Current Facility-Administered Medications Medication Dose Route Frequency  piperacillin-tazobactam (ZOSYN) 4.5 g in 0.9% sodium chloride (MBP/ADV) 100 mL MBP  4.5 g IntraVENous Q6H  
 sodium chloride (NS) flush 5-10 mL  5-10 mL IntraVENous PRN  
 electrolyte-r (NORMOSOL R) infusion   IntraVENous CONTINUOUS  
 sodium chloride (NS) flush 5-40 mL  5-40 mL IntraVENous Q8H  
 sodium chloride (NS) flush 5-40 mL  5-40 mL IntraVENous PRN  
 NOREPINephrine (LEVOPHED) 8 mg in 5% dextrose 250mL infusion  2-30 mcg/min IntraVENous TITRATE  glucose chewable tablet 16 g  4 Tab Oral PRN  
 glucagon (GLUCAGEN) injection 1 mg  1 mg IntraMUSCular PRN  
 dextrose (D50W) injection syrg 12.5-25 g  25-50 mL IntraVENous PRN  
 heparin (porcine) injection 5,000 Units  5,000 Units SubCUTAneous Q8H  
 hydrocortisone Sod Succ (PF) (SOLU-CORTEF) injection 50 mg  50 mg IntraVENous Q6H  
 vasopressin (VASOSTRICT) 20 Units in 0.9% sodium chloride 100 mL infusion  0.03 Units/min IntraVENous CONTINUOUS  
 
 
 LAB AND IMAGING FINDINGS:  
 
Lab Results Component Value Date/Time WBC 10.4 03/29/2019 03:52 AM  
 HGB 10.1 (L) 03/29/2019 03:52 AM  
 PLATELET 315 45/71/3456 03:52 AM  
 
Lab Results Component Value Date/Time Sodium 141 03/29/2019 03:52 AM  
 Potassium 4.0 03/29/2019 03:52 AM  
 Chloride 113 (H) 03/29/2019 03:52 AM  
 CO2 20 (L) 03/29/2019 03:52 AM  
 BUN 34 (H) 03/29/2019 03:52 AM  
 Creatinine 1.43 (H) 03/29/2019 03:52 AM  
 Calcium 7.6 (L) 03/29/2019 03:52 AM  
 Magnesium 2.1 03/29/2019 03:52 AM  
 Phosphorus 3.5 03/29/2019 03:52 AM  
  
Lab Results Component Value Date/Time AST (SGOT) 18 03/29/2019 03:52 AM  
 Alk. phosphatase 73 03/29/2019 03:52 AM  
 Protein, total 6.8 03/29/2019 03:52 AM  
 Albumin 2.5 (L) 03/29/2019 03:52 AM  
 Globulin 4.3 (H) 03/29/2019 03:52 AM  
 
No results found for: INR, PTMR, PTP, PT1, PT2, APTT No results found for: IRON, FE, TIBC, IBCT, PSAT, FERR No results found for: PH, PCO2, PO2 No components found for: [place] Lab Results Component Value Date/Time CK 35 (L) 03/29/2019 08:55 AM  
 CK - MB 3.5 03/29/2019 08:55 AM  
  
 
   
 
Total time: 30 
Counseling / coordination time, spent as noted above: 25 
> 50% counseling / coordination: yes with wife, ICU provider Prolonged service was provided for  []30 min   []75 min in face to face time in the presence of the patient, spent as noted above. Time Start:  
Time End:  
Note: this can only be billed with 63727 (initial) or 43892 (follow up). If multiple start / stop times, list each separately.

## 2019-03-29 NOTE — PROGRESS NOTES
attended the interdisciplinary rounds for Nithin Greer, who is a 80 y. o.,male. Patients Primary Language is: Aixa Phlegm. According to the patients EMR Zoroastrianism Affiliation is: Worship.  
 
The reason the Patient came to the hospital is:  
Patient Active Problem List  
 Diagnosis Date Noted  Septic shock ([facility] [address] 03/28/2019  Shock ([facility] [address] 03/28/2019  Alzheimer's dementia 11/30/2016  [facility] 11/30/2016  
 HTN (hypertension) 11/30/2016  Hypotension, unspecified 11/29/2016  Dehydration 11/29/2016 Plan: 
Chaplains will continue to follow and will provide pastoral care on an as needed/requested basis.  recommends bedside caregivers page  on duty if patient shows signs of acute spiritual or emotional distress. [address] [facility] Board Certified [address] [place] [address] Spiritual Care  
[phone]

## 2019-03-29 NOTE — PROGRESS NOTES
Bedside and Verbal shift change report given to Kaitlin Evans RN (oncoming nurse) by Clarissa Singletary 
 (offgoing nurse). Report included the following information SBAR, Kardex, ED Summary, Procedure Summary, Intake/Output, MAR and Recent Results. Visit Vitals /57 Pulse (!) 57 Temp 97.8 °F (36.6 °C) Resp 17 Ht 6' 0.01\" (1.829 m) Wt 81.6 kg (180 lb) SpO2 97% BMI 24.41 kg/m²

## 2019-03-29 NOTE — PROGRESS NOTES
[facility]. Pulmonary, Critical Care, and Sleep Medicine Name: Enrique Casey MRN: [id number] : 1931 Hospital: [address] [place] [address] Dr Date: 3/29/2019  Admission Date: 3/28/2019 Chart and notes reviewed. Data reviewed. I have evaluated all findings. [x]I have reviewed the flowsheet and previous days notes. [x]The patient is unable to give any meaningful history or review of systems because the patient is: 
[]Intubated [x]Baseline dementia -only says a few words []Unresponsive [x]The patient is critically ill on     
[]Mechanical ventilation [x]Pressors []BiPAP []  
 
 
80 y.o. male with a medical history of dementia Parkinson's disease, bedbound and from nursing home, brought to the ED for lethargy and hypotension. Admitted to ICU for sepsis with multi-organ dysfunction to include MARIUM and altered mental status. Blood and urine cultures obtained and are currently pending. Initiated on broad spectrum antibiotics with vancomycin and zosyn. 19 No new events overnight. Vasopressors actively being weaned off -currently on vasopressin only; off levophed. Mentation: with some improvement -staying awake and alert today and able to say some few words. Respiratory/ Secretions: none Hemodynamics: improving -only on 1 vasopressor Urine output: adequate Need for procedures: none ROS:Review of systems not obtained due to patient factors. Events and notes from last 24 hours reviewed. Care plan discussed on multidisciplinary rounds. Patient Active Problem List  
Diagnosis Code  Hypotension, unspecified I95.9  Dehydration E86.0  Alzheimer's dementia G30.9, F02.80  
 COPD (chronic obstructive pulmonary disease) ([facility]) J44.9  
 HTN (hypertension) I10  
 Septic shock ([facility]) A41.9, R65.21  Shock ([address] R57.9 Vital Signs: 
Visit Vitals BP 97/57 Pulse 64 Temp 97.8 °F (36.6 °C) Resp 17 Ht 6' 0.01\" (1.829 m) Wt 81.6 kg (180 lb) SpO2 96% BMI 24.41 kg/m² O2 Device: Room air O2 Flow Rate (L/min): 1 l/min Temp (24hrs), Av °F (36.7 °C), Min:97.6 °F (36.4 °C), Max:100 °F (37.8 °C) Intake/Output:  
Last shift:      No intake/output data recorded. Last 3 shifts:  1901 -  0700 In: 4684.8 [I.V.:4684.8] Out: 445 [Urine:445] Intake/Output Summary (Last 24 hours) at 3/29/2019 1245 Last data filed at 3/29/2019 0700 Gross per 24 hour Intake 3684.78 ml Output 445 ml Net 3239.78 ml Ventilator Settings: 
  
  
  
  
 
Current Facility-Administered Medications Medication Dose Route Frequency  piperacillin-tazobactam (ZOSYN) 4.5 g in 0.9% sodium chloride (MBP/ADV) 100 mL MBP  4.5 g IntraVENous Q6H  
 electrolyte-r (NORMOSOL R) infusion   IntraVENous CONTINUOUS  
 sodium chloride (NS) flush 5-40 mL  5-40 mL IntraVENous Q8H  
 NOREPINephrine (LEVOPHED) 8 mg in 5% dextrose 250mL infusion  2-30 mcg/min IntraVENous TITRATE  heparin (porcine) injection 5,000 Units  5,000 Units SubCUTAneous Q8H  
 hydrocortisone Sod Succ (PF) (SOLU-CORTEF) injection 50 mg  50 mg IntraVENous Q6H  
 vasopressin (VASOSTRICT) 20 Units in 0.9% sodium chloride 100 mL infusion  0.03 Units/min IntraVENous CONTINUOUS Telemetry: NSR Physical Exam:  
General:  Awake, alert, looks around Head:  Normocephalic, without obvious abnormality, atraumatic. Eyes:  Pink palpebral conjunctivae, anicteric sclerae Nose: Nares normal. No drainage Throat: Lips, mucosa, and tongue mildly dry. Neck: Supple, symmetrical, trachea midline, no adenopathy, no carotid bruit and no JVD. Lungs:   Symmetrical chest rise; good AE bilat; CTAB; no wheezes/rhonchi/rales noted. Heart:  Regular rate and rhythm, + systolic murmur best heard to left parasternal border Abdomen:   Soft, non-tender. Bowel sounds normal.   
Extremities: Extremities normal, atraumatic, no cyanosis or edema. Pulses: 2+ and symmetric all extremities. Skin: Skin cool to touch Neurologic: Grossly nonfocal - able to say few words and look around DATA: 
MAR reviewed and pertinent medications noted or modified as needed Labs: 
Recent Labs  
  03/29/19 
0352 03/28/19 
1520 03/28/19 
1130 WBC 10.4 14.9* 14.7* HGB 10.1* 11.2* 11.9*  
HCT 31.1* 35.0* 37.4  288 262 Recent Labs  
  03/29/19 
0352 03/28/19 
1520 03/28/19 
1100  143 143  
K 4.0 4.0 4.4 * 112* 107 CO2 20* 21 22 * 170* 134* BUN 34* 30* 31* CREA 1.43* 1.58* 1.84* CA 7.6* 7.7* 8.6 MG 2.1 2.0  --   
PHOS 3.5 4.7  --   
ALB 2.5* 2.5* 2.7* SGOT 18 14* 16 ALT 8* 6* 7* No results for input(s): PH, PCO2, PO2, HCO3, FIO2 in the last 72 hours. No results for input(s): FIO2I, IFO2, HCO3I, IHCO3, HCOPOC, PCO2I, PCOPOC, IPHI, PHI, PHPOC, PO2I, PO2POC in the last 72 hours. No lab exists for component: IPOC2 Imaging: 
[x]   I have personally reviewed the patients radiographs and reports XR Results (most recent): 
Results from Hospital Encounter encounter on 03/28/19 XR CHEST PORT Narrative Portable CXR: 
 
Time stamped: 1440 hours COMPARISON: 1059 hours HISTORY: Central line placement. Left IJ catheter crosses the midline, tip at the proximal SVC entrance. No 
pneumothorax. Increasing density in the right medial lung base. Cardiomegaly with ectatic 
aorta as before. No vascular congestion. No pleural effusion. Impression IMPRESSION: Left central catheter tip overlying SVC entrance. No pneumothorax. More focal infiltrate/atelectasis in the right medial lung base. No CHF. CT Results (most recent): No results found for this or any previous visit. IMPRESSION:  
· Shock, possibly vasodilatory +/- hypovolemic, need to r/o underlying sepsis · Acute on chronic encephalopathy, likely metabolic in setting of advanced dementia · Systemic inflammatory response with leukocytosis, tachycardia, elevated initial lactate · Acute kidney injury -gradually improving · Abnormal UA with large LE, pyuria, bacteriuria · Mildly reduced EF 50-55% on echo (2016) · Dementia -baseline mental status: few intermittent words, bed-bound · Nursing home patient Patient Active Problem List  
Diagnosis Code  Hypotension, unspecified I95.9  Dehydration E86.0  Alzheimer's dementia G30.9, F02.80  
 COPD (chronic obstructive pulmonary disease) ([facility]) J44.9  
 HTN (hypertension) I10  
 Septic shock ([facility]) A41.9, R65.21  Shock ([facility] R57.9 RECOMMENDATIONS:  
· Neuro: appears to be back to baseline. Watch closely · Pulm: stable on room air. May have oxygen supplementation as needed, target SpO2 > 90% · CVS: gradually improving hemodynamics -actively wean vasopressor, target MAP at least 65 mmHg. Continue stress dose IV steroid. No escalation of care -limit to only 2 pressors as currently ordered. Mild elevation in cardiac enzymes likely demand ischemia from hypotension. · GI: SLP eval; strict NPO. No tube feeding per Luis · Renal: renal ultrasound today. Follow renal indices · Hem/Onc: stable H/H, platelets · I/D: follow blood and urine c/s. Urine Strep and Legionella negative. Continue zosyn; d/c vancomycin · Endocrine: frequently glucose checks while NPO. Avoid hypoglycemia · Musc/Skin: stable, no concerns · Code Status: DNR/ DNI (TOMER and [place] POST on file). Continue current level of treatment and will re-address with family again in next 24-48 hours if no clinical improvement seen. Hospital Palliative Care team consulted given their team's familiarity with patient - likely transition to their service for follow-up once discharged out of ICU. Best practice : 
 
Glycemic control IHI ICU bundles: 
 Central Line Bundle Followed DVT prophylaxis. Need for Lines, nicholson assessed. Palliative care evaluation. High complexity decision making was performed during this consultation and evaluation. [x]       Pt is at high risk for further organ failure and dysfunction. Critical care time spent: 35 minutes with patient exclusive of procedures.  
 
 
Rowdy Mendieta PA-C

## 2019-03-30 NOTE — PROGRESS NOTES
SPEECH LANGUAGE PATHOLOGY BEDSIDE SWALLOW EVALUATION Patient: Nancy Neville (38 y.o. male) Date: 3/30/2019 Primary Diagnosis: Septic shock ([facility] [A41.9, R65.21] Shock ([facility] [R57.9] Precautions: Aspiration Risk ASSESSMENT : 
Based on the objective data described below, the patient presents with moderate pral and mild pharyngeal dysphagia. Clinical beside swallow eval completed per MD orders. Pt A&Ox1, with baseline dementia. Speech/voice within functional limits. Oral [facility] examination revealed structures functional for speech and deglutition with limited natural dentition. Pt's wife is at b/s and reported he previously was consuming a puree diet with thin liquids at [facility]. Pt observed with ice chips, thin liquids +/- straw via single sips, and with tsps of puree. Pt exhibiting oral spillage with thin liquids d/t R facial droop. Pt tolerated thin liquids via spoon and controlled sip from straw. No overt s/sx of aspiration; no change in VQ noted. Pt warranted extended time for bolus manipulation of puree. Min delay in deglutition with weak laryngeal elevation to palpation. SLP recommends a puree diet with thin liquids via tsp or by controlled straw with feeding assistance. Meds crushed in puree. Aspiration precautions in place. Pt would benefit from a MBS to r/o silent aspiration penetration; he does have a hx of silent penetration via straw with thin liquids in 2015. Pt, wife, and RN Suzi Rinne were educated re: diagnosis, results,  
 general safe swallow precautions, s/sx aspiration, aspiration risk, diet recs and role of SLP. ST will continue to follow. TREATMENT : 
Skilled therapy initiated; Educated pt on aspiration precautions and importance of compensatory swallow techniques to decrease aspiration risk (decrease rate of intake & sip/bite size, upright @HOB for all po intake and ~30 minutes after po); verbalized comprehension. SLP to follow as indicated. Patient will benefit from skilled intervention to address the above impairments. Patient?s rehabilitation potential is considered to be Fair Factors which may influence rehabilitation potential include: ? None noted ? Mental ability/status ? Medical condition ? Home/family situation and support systems ? Safety awareness ? Pain tolerance/management ? Other: PLAN : 
Recommendations and Planned Interventions: 
Puree with thin liquids via tsp/ controlled straw with feeding assistance Aspiration precautions HOB >45 during po intake, remain >30 for 30-45 minutes after po Small bites/sips; alternate liquid/solid with slow feeding rate Oral care TID Meds crushed in puree MBS to further assess oropharyngeal swallow fxn Frequency/Duration: Patient will be followed by speech-language pathology 1-2 times per day/4-7 days per week to address goals. Discharge Recommendations: Sarath Iqbal SUBJECTIVE:  
Patient stated ? More! \" OBJECTIVE:  
 
Past Medical History:  
Diagnosis Date Bowel obstruction ([facility] Cancer [facility])   
 colon Chronic obstructive pulmonary disease (HCC) Dementia Hyperlipidemia Hypertension Muscle weakness Parkinson's disease ([facility] Past Surgical History:  
Procedure Laterality Date HX GI    
 colon cancer HX HEENT    
 left eye cataract Prior Level of Function/Home Situation:  
Home Situation Home Environment: Skilled nursing facility One/Two Story Residence: Other (Comment) Living Alone: No 
Support Systems: Spouse/Significant Other/Partner Patient Expects to be Discharged to[de-identified] Skilled nursing facility Current DME Used/Available at Home: Other (comment)(unknown) Diet prior to admission: Puree with thin liquids Current Diet:  Puree with thin liquids via tsp or controlled amount via straw Cognitive and Communication Status: Neurologic State: Alert Orientation Level: Oriented to person Cognition: Decreased command following, Impaired decision making Oral Assessment: 
Oral Assessment Labial: Impaired coordination;Right droop Dentition: Limited Oral Hygiene: fair Lingual: Decreased rate;Decreased strength P.O. Trials: 
Patient Position: (60*) Vocal quality prior to P.O.: Low volume Consistency Presented: Ice chips;Puree; Thin liquid How Presented: SLP-fed/presented;Cup/sip;Spoon;Straw;Successive swallows Bolus Acceptance: Impaired Bolus Formation/Control: Impaired Propulsion: No impairment Oral Residue: None Initiation of Swallow: Delayed (# of seconds) Laryngeal Elevation: Weak Aspiration Signs/Symptoms: None Pharyngeal Phase Characteristics: Double swallow;Easily fatigued ; Poor endurance Effective Modifications: Alternate liquids/solids; Double swallow;Spoon Cues for Modifications: Moderate-maximal 
  
 
Oral Phase Severity: Moderate Pharyngeal Phase Severity : Mild The severity rating is based on the following outcomes:   
Clinical Judgment Pain: 
Pt c/o 0/10 pain prior to evaluation. Pt c/o 0/10 pain post evaluation. After treatment:  
?            Patient left in no apparent distress sitting up in chair ? Patient left in no apparent distress in bed ? Call bell left within reach ? Nursing notified ? Caregiver present ? Bed alarm activated COMMUNICATION/EDUCATION:  
?            SLP educated pt with regard to compensatory swallow strategies and 
     aspiration/reflux precautions including: small bites/sips, 
     alternate liquids/solids, decrease feeding rate, HOB > 45 with all po, and upright in bed at 30 degrees after po for at least 45 minutes. ?            Patient/family have participated as able in goal setting and plan of care. ?            Patient/family agree to work toward stated goals and plan of care. ?            Patient understands intent and goals of therapy; neutral about participation. ? Patient is unable to participate in goal setting and plan of care. Thank you for this referral. 
Kita Rodrigez, SLP 
MA, CCC-SLP Speech-Language Pathologist

## 2019-03-30 NOTE — ROUTINE PROCESS
Bedside and Verbal shift change report given to ROSS Hernandez (oncoming nurse) by Wiliam Moss (offgoing nurse). Report included the following information SBAR, Kardex, Intake/Output, MAR and Recent Results.

## 2019-03-30 NOTE — PROGRESS NOTES
TRANSFER - OUT REPORT: 
 
Verbal report given to Javed(name) on Janina Flank  being transferred to [facility](unit) for routine progression of care Report consisted of patients Situation, Background, Assessment and  
Recommendations(SBAR). Information from the following report(s) SBAR, Kardex and MAR was reviewed with the receiving nurse. Lines:  
Peripheral IV 03/28/19 Left Forearm (Active) Site Assessment Clean, dry, & intact 3/30/2019 12:28 PM  
Phlebitis Assessment 0 3/30/2019 12:28 PM  
Infiltration Assessment 0 3/30/2019 12:28 PM  
Dressing Status Clean, dry, & intact 3/30/2019 12:28 PM  
Dressing Type Transparent;Tape 3/30/2019 12:28 PM  
Hub Color/Line Status Blue;Flushed;Patent;Capped 3/29/2019  8:00 PM  
Action Taken Open ports on tubing capped 3/29/2019  8:00 PM  
Alcohol Cap Used Yes 3/29/2019  8:00 PM  
   
Peripheral IV 03/30/19 Left Hand (Active) Opportunity for questions and clarification was provided. Patient transported with: 
 Registered Nurse Tech

## 2019-03-30 NOTE — ROUTINE PROCESS
Patient admitted to floor as a transfer from ICU. No acute distress noted. No c/o pain at the present time. Patient responds verbally and is A&O to self. Family member is at the bedside.

## 2019-03-30 NOTE — ROUTINE PROCESS
Bedside and Verbal shift change report given to [employer] (oncoming nurse) by Bear Matthews RN (offgoing nurse). Report included the following information SBAR, Kardex, MAR and Recent Results. SITUATION:  
? Code Status: DNR 
? Reason for Admission: Septic shock ([facility] [A41.9, R65.21] ? Shock ([facility] [R57.9] ? Hospital day: 2 
? Problem List:  
   
Hospital Problems  Date Reviewed: 11/30/2016 Codes Class Noted POA Septic shock (HCC) ICD-10-CM: A41.9, R65.21 ICD-9-CM: 038.9, 785.52, 995.92  3/28/2019 Unknown Shock ([facility] ICD-10-CM: R57.9 ICD-9-CM: 785.50  3/28/2019 Unknown BACKGROUND:  
 Past Medical History:  
Past Medical History:  
Diagnosis Date  Bowel obstruction ([facility]  Cancer [facility])   
 colon  Chronic obstructive pulmonary disease ([facility]  Dementia  Hyperlipidemia  Hypertension  Muscle weakness  Parkinson's disease ([facility] Patient taking anticoagulants yes ASSESSMENT:  
? Changes in Assessment Throughout Shift: restless, combative , restraints ordered for patient safety ? Patient has Central Line: no Reasons if yes: LIJ CVL ? Patient has Finley Cath: no Reasons if yes: Retention ? Last Vitals: 
  
Vitals:  
 03/30/19 0800 03/30/19 0830 03/30/19 0900 03/30/19 0930 BP: 91/55  98/58 95/60 Pulse: 72 74 66 75 Resp: 19 16 18 19 Temp:      
SpO2: 98% 100% 100% 99% Weight:      
Height:      
 
 
? IV and DRAINS (will only show if present) Peripheral IV 03/28/19 Left Forearm-Site Assessment: Clean, dry, & intact [REMOVED] Venous Access Device 03/28/19 Other (comment)-Site Assessment: Clean, dry, & intact [REMOVED] Peripheral IV 03/28/19 Right Wrist-Site Assessment: Clean, dry, & intact [REMOVED] Peripheral IV 03/28/19 Left Antecubital-Site Assessment: Clean, dry, & intact Triple Lumen 03/28/19 Left Neck-Site Assessment: Clean, dry, & intact ? WOUND (if present) Wound Type:  none Dressing present Dressing Present : Yes, Intact, not due to be changed Wound Concerns/Notes:  none ? PAIN Pain Assessment Pain Intensity 1: 0 (03/30/19 0400) Patient Stated Pain Goal: 0 
o Interventions for Pain:  none 
o Intervention effective: no 
o Time of last intervention: 0400  
o Reassessment Completed: no  
 
? Last 3 Weights: 
Last 3 Recorded Weights in this Encounter 03/28/19 1103 03/28/19 1130 Weight: 81.6 kg (180 lb) 81.6 kg (180 lb) Weight change: ? INTAKE/OUPUT Current Shift: No intake/output data recorded. Last three shifts: 03/28 1901 - 03/30 0700 In: 2144 [I.V.:4948] Out: 1295 [NMKMV:[id number]] ? LAB RESULTS Recent Labs  
  03/30/19 
0300 03/29/19 
0352 03/28/19 
1520 WBC 10.0 10.4 14.9* HGB 9.6* 10.1* 11.2* HCT 30.8* 31.1* 35.0*  
 230 288 Recent Labs  
  03/30/19 
0300 03/29/19 
0352 03/28/19 
1520 * 141 143  
K 3.8 4.0 4.0  
* 154* 170* BUN 35* 34* 30* CREA 1.16 1.43* 1.58* CA 8.5 7.6* 7.7* MG 2.5 2.1 2.0  
 
 
RECOMMENDATIONS AND DISCHARGE PLANNING 1. Pending tests/procedures/ Plan of Care or Other Needs: swallowing eval  
 
2. Discharge plan for patient and Needs/Barriers: pending 3. Estimated Discharge Date: 04/11/19 Posted on Whiteboard in [facility] Room: yes 4. The patient's care plan was reviewed with the oncoming nurse. \"HEALS\" SAFETY CHECK Fall Risk Total Score: 4 Safety Measures: Safety Measures: Bed/Chair-Wheels locked, Bed in low position, Call light within reach A safety check occurred in the patient's room between off going nurse and oncoming nurse listed above. The safety check included the below items Area Items H High Alert Medications ? Verify all high alert medication drips (heparin, PCA, etc.) E Equipment ? Suction is set up for ALL patients (with yanker) ? Red plugs utilized for all equipment (IV pumps, etc.) ? WOWs wiped down at end of shift. ? Room stocked with oxygen, suction, and other unit-specific supplies A Alarms ? Bed alarm is set for fall risk patients ? Ensure chair alarm is in place and activated if patient is up in a chair L Lines ? Check IV for any infiltration ? Finley bag is empty if patient has a Finley ? Tubing and IV bags are labeled Sekou Lewis Safety ? Room is clean, patient is clean, and equipment is clean. ? Hallways are clear from equipment besides carts. ? Fall bracelet on for fall risk patients ? Ensure room is clear and free of clutter ? Suction is set up for ALL patients (with mio) ? Hallways are clear from equipment besides carts. ? Isolation precautions followed, supplies available outside room, sign posted Leyla Nash RN

## 2019-03-30 NOTE — PROGRESS NOTES
Hospitalist Progress Note Patient: Sharonda Headings Age: 80 y.o. : 1931 MR#: [id number] SSN: [id number] Date/Time: 3/30/2019 11:19 AM 
 
Date of Admission: 3/28/2019 Subjective:  
 
Patient is transferring out of ICU today. Wife at bedside, she spoke for pt. She expressed that his is much more better then when he initially presented. She understands pt's current medical management and expressed that she wants to wait until speech therapist evaluation for consideration of re-feeding pt. She does not want to put him at risk for aspiration at this time. Pt expressed, \"I want to eat. \" 
 
ROS: pt is with underlying dementia but unable to answer yes/no question. no fever/chills, no headache, no dizziness, no facial pain, no sinus congestion, No swallowing pain, No chest pain, no palpitation, no shortness of breath, no abd pain, No diarrhea, no urinary complaint, no leg pain or swelling Hospital course: He presented from nursing home after he was found lethargic and hypotensive. At baseline, he remained bedbound with underlying dementia. In the ER, he was found to be in septic shock from unclear infection source. He was admitted to ICU where he required pressor support. He was also found to have MARIUM with slight increase in troponin which have been corrected. He has been stabilized and off pressor. Infectious source is unclear but he has been treated for suspected UTI. He has been on stress dose steroid. He has been NPO due to concern for aspiration. Speech therapy to follow up. In the nursing, his wife stated that he take pureed food. Assessment/Plan: 1. Acute encephalopathy with underlying Alzheimer's dementia, likely metabolic and infectious, resolving to baseline. 2.  Sepsis with shock, (SIRS with UTI on POA), resolved 3. MARIUM on CKD III, likely due to sepsis 4. Elevated troponin in the setting of sepsis 5. HTN 
6. COPD, stable 7.  Parkinson's disease with baseline bedbound 8. BPH  
9. Dysphagia with risk of aspiration 10. Normocytic anemia 11. Mild hypernatremia He will continue on IV antibiotic for now, will follow up on blood culture and urine culture for completeness. On tapering steroid dosing, but at home, he is also on Midodrine, will add if needed. Continues on IV fluid for now, await for speech therapy to see if he can sustain oral intake Re-check BMP and cardiac enzyme, but will hold off further workup as Wife if still deciding on care with palliative Will need ICS and bronchodilator prn, needs electrolyte replacement. Will need to resume parkinson medications Will transition back to LTC when medically stable. DNR/DNI Spoke with wife for today care plan Additional Notes:   
 
Case discussed with:  [x]Patient  [x]Family  []Nursing  []Case Management DVT Prophylaxis:  []Lovenox  [x]Hep SQ  []SCDs  []Coumadin   []On Heparin gtt Signed By: Cindy Huber MD   
 2019 11:19 AM  
 
 
 
 
Objective:  
VS:  
Visit Vitals BP 95/60 Pulse 75 Temp 97.7 °F (36.5 °C) Resp 19 Ht 6' 0.01\" (1.829 m) Wt 81.6 kg (180 lb) SpO2 99% BMI 24.41 kg/m² Tmax/24hrs: Temp (24hrs), Av.9 °F (36.6 °C), Min:97.7 °F (36.5 °C), Max:98.2 °F (36.8 °C) Intake/Output Summary (Last 24 hours) at 3/30/2019 1119 Last data filed at 3/30/2019 0700 Gross per 24 hour Intake 2396.8 ml Output 850 ml Net 1546.8 ml Tele: sinus General:  Cooperative, Not in acute distress, speaks in short sentence while in bed HEENT: PERRL, EOMI, supple neck, no JVD, dry oral mucosa Cardiovascular: S1S2 regular, no rub/gallop Pulmonary: Clear air entry bilaterally, no wheezing, no crackle GI:  Soft, non tender, non distended, +bs, no guarding Extremities:  No pedal edema, +distal pulses appreciated Neuro: alert to self, laying in bed, able to move his extremities against gravity Additional: Current Facility-Administered Medications Medication Dose Route Frequency  dextrose 5% infusion  50 mL/hr IntraVENous CONTINUOUS  
 hydrocortisone Sod Succ (PF) (SOLU-CORTEF) injection 50 mg  50 mg IntraVENous Q8H  
 [START ON 3/31/2019] hydrocortisone Sod Succ (PF) (SOLU-CORTEF) injection 50 mg  50 mg IntraVENous Q12H  
 [START ON 4/2/2019] hydrocortisone Sod Succ (PF) (SOLU-CORTEF) injection 50 mg  50 mg IntraVENous ONCE  piperacillin-tazobactam (ZOSYN) 4.5 g in 0.9% sodium chloride (MBP/ADV) 100 mL MBP  4.5 g IntraVENous Q6H  
 sodium chloride (NS) flush 5-40 mL  5-40 mL IntraVENous Q8H  
 sodium chloride (NS) flush 5-40 mL  5-40 mL IntraVENous PRN  
 glucose chewable tablet 16 g  4 Tab Oral PRN  
 glucagon (GLUCAGEN) injection 1 mg  1 mg IntraMUSCular PRN  
 dextrose (D50W) injection syrg 12.5-25 g  25-50 mL IntraVENous PRN  
 heparin (porcine) injection 5,000 Units  5,000 Units SubCUTAneous Q8H Labs:   
Recent Results (from the past 24 hour(s)) GLUCOSE, POC Collection Time: 03/29/19 11:24 AM  
Result Value Ref Range Glucose (POC) 134 (H) 70 - 110 mg/dL GLUCOSE, POC Collection Time: 03/29/19  4:31 PM  
Result Value Ref Range Glucose (POC) 123 (H) 70 - 110 mg/dL CBC WITH AUTOMATED DIFF Collection Time: 03/30/19  3:00 AM  
Result Value Ref Range WBC 10.0 4.6 - 13.2 K/uL  
 RBC 3.26 (L) 4.70 - 5.50 M/uL HGB 9.6 (L) 13.0 - 16.0 g/dL HCT 30.8 (L) 36.0 - 48.0 % MCV 94.5 74.0 - 97.0 FL  
 MCH 29.4 24.0 - 34.0 PG  
 MCHC 31.2 31.0 - 37.0 g/dL  
 RDW 13.9 11.6 - 14.5 % PLATELET 570 681 - 010 K/uL MPV 10.9 9.2 - 11.8 FL  
 NEUTROPHILS 88 (H) 40 - 73 % LYMPHOCYTES 7 (L) 21 - 52 % MONOCYTES 5 3 - 10 % EOSINOPHILS 0 0 - 5 % BASOPHILS 0 0 - 2 %  
 ABS. NEUTROPHILS 8.8 (H) 1.8 - 8.0 K/UL  
 ABS. LYMPHOCYTES 0.7 (L) 0.9 - 3.6 K/UL  
 ABS. MONOCYTES 0.5 0.05 - 1.2 K/UL  
 ABS. EOSINOPHILS 0.0 0.0 - 0.4 K/UL  
 ABS. BASOPHILS 0.0 0.0 - 0.1 K/UL DF AUTOMATED METABOLIC PANEL, COMPREHENSIVE Collection Time: 03/30/19  3:00 AM  
Result Value Ref Range Sodium 146 (H) 136 - 145 mmol/L Potassium 3.8 3.5 - 5.5 mmol/L Chloride 113 (H) 100 - 108 mmol/L  
 CO2 20 (L) 21 - 32 mmol/L Anion gap 13 3.0 - 18 mmol/L Glucose 125 (H) 74 - 99 mg/dL BUN 35 (H) 7.0 - 18 MG/DL Creatinine 1.16 0.6 - 1.3 MG/DL  
 BUN/Creatinine ratio 30 (H) 12 - 20 GFR est AA >60 >60 ml/min/1.73m2 GFR est non-AA 60 (L) >60 ml/min/1.73m2 Calcium 8.5 8.5 - 10.1 MG/DL Bilirubin, total 0.4 0.2 - 1.0 MG/DL  
 ALT (SGPT) 19 16 - 61 U/L  
 AST (SGOT) 27 15 - 37 U/L Alk. phosphatase 69 45 - 117 U/L Protein, total 6.8 6.4 - 8.2 g/dL Albumin 2.7 (L) 3.4 - 5.0 g/dL Globulin 4.1 (H) 2.0 - 4.0 g/dL A-G Ratio 0.7 (L) 0.8 - 1.7 MAGNESIUM Collection Time: 03/30/19  3:00 AM  
Result Value Ref Range Magnesium 2.5 1.6 - 2.6 mg/dL PHOSPHORUS Collection Time: 03/30/19  3:00 AM  
Result Value Ref Range Phosphorus 3.1 2.5 - 4.9 MG/DL  
GLUCOSE, POC Collection Time: 03/30/19  7:44 AM  
Result Value Ref Range Glucose (POC) 109 70 - 110 mg/dL GLUCOSE, POC Collection Time: 03/30/19 10:32 AM  
Result Value Ref Range Glucose (POC) 112 (H) 70 - 110 mg/dL

## 2019-03-30 NOTE — PROGRESS NOTES
[facility]. Pulmonary, Critical Care, and Sleep Medicine Name: Janina Almodovar MRN: [id number] : 1931 Hospital: [address] [place] [address] Date: 3/30/2019  Admission Date: 3/28/2019 Chart and notes reviewed. Data reviewed. I have evaluated all findings. [x]I have reviewed the flowsheet and previous days notes. []The patient is unable to give any meaningful history or review of systems because the patient is: 
[]Intubated []Sedated  
[]Unresponsive []The patient is critically ill on     
[]Mechanical ventilation []Pressors []BiPAP []  
80 y. o. male with a medical history of dementia Parkinson's disease, bedbound and from nursing home, brought to the ED for lethargy and hypotension. Admitted to ICU for sepsis with multi-organ dysfunction to include MARIUM and altered mental status. Blood and urine cultures obtained and are currently negative, de-escalated abx to pip-Tazo only on 3/29/19 
 
03/30/19 Doing well overnight, hemodynamically stable, off vasoactive agents, able to answer questions appropriately. ROS:A comprehensive review of systems was negative. Events and notes from last 24 hours reviewed. Care plan discussed on multidisciplinary rounds. Patient Active Problem List  
Diagnosis Code  Hypotension, unspecified I95.9  Dehydration E86.0  Alzheimer's dementia G30.9, F02.80  
 COPD (chronic obstructive pulmonary disease) ([facility]) J44.9  
 HTN (hypertension) I10  
 Septic shock ([facility]) A41.9, R65.21  Shock ([address] R57.9 Vital Signs: 
Visit Vitals /71 (BP 1 Location: Left arm, BP Patient Position: At rest) Pulse 67 Temp 97.7 °F (36.5 °C) Resp 19 Ht 6' 0.01\" (1.829 m) Wt 81.6 kg (180 lb) SpO2 97% BMI 24.41 kg/m² O2 Device: Room air O2 Flow Rate (L/min): 1 l/min Temp (24hrs), Av.9 °F (36.6 °C), Min:97.7 °F (36.5 °C), Max:98.2 °F (36.8 °C) Intake/Output: Last shift:      No intake/output data recorded. Last 3 shifts: 03/28 1901 - 03/30 0700 In: 7552 [I.V.:4948] Out: 1295 [KHDSV:8220] Intake/Output Summary (Last 24 hours) at 3/30/2019 1318 Last data filed at 3/30/2019 0700 Gross per 24 hour Intake 2167.6 ml Output 850 ml Net 1317.6 ml  
  
Ventilator Settings: 
  
  
  
  
 
Current Facility-Administered Medications Medication Dose Route Frequency  dextrose 5% infusion  50 mL/hr IntraVENous CONTINUOUS  
 hydrocortisone Sod Succ (PF) (SOLU-CORTEF) injection 50 mg  50 mg IntraVENous Q8H  
 [START ON 3/31/2019] hydrocortisone Sod Succ (PF) (SOLU-CORTEF) injection 50 mg  50 mg IntraVENous Q12H  
 [START ON 4/2/2019] hydrocortisone Sod Succ (PF) (SOLU-CORTEF) injection 50 mg  50 mg IntraVENous ONCE  
 albuterol-ipratropium (DUO-NEB) 2.5 MG-0.5 MG/3 ML  3 mL Nebulization Q6H RT  
 budesonide (PULMICORT) 500 mcg/2 ml nebulizer suspension  500 mcg Nebulization BID RT  
 piperacillin-tazobactam (ZOSYN) 4.5 g in 0.9% sodium chloride (MBP/ADV) 100 mL MBP  4.5 g IntraVENous Q6H  
 sodium chloride (NS) flush 5-40 mL  5-40 mL IntraVENous Q8H  
 heparin (porcine) injection 5,000 Units  5,000 Units SubCUTAneous Q8H Telemetry:  
 
Physical Exam:  
General:  Awake, alert, answers questions Head:  Normocephalic, without obvious abnormality, atraumatic. Eyes:  Pink palpebral conjunctivae Nose: Nares normal. No drainage Throat: Lips, mucosa, and tongue mildly dry. Lungs:   Symmetrical chest rise; good AE bilat; CTAB; no wheezes/rhonchi/rales noted. Heart:  Regular rate + systolic murmur best heard to left parasternal border Abdomen:   Soft, non-tender. Bowel sounds normal.   
Extremities: Extremities normal, atraumatic, no cyanosis or edema. Pulses: 2+ and symmetric all extremities. Skin: Skin cool to touch Neurologic: Grossly nonfocal - able to say few words and look around DATA: 
 MAR reviewed and pertinent medications noted or modified as needed Labs: 
Recent Labs  
  03/30/19 
0300 03/29/19 
0352 03/28/19 
1520 WBC 10.0 10.4 14.9* HGB 9.6* 10.1* 11.2* HCT 30.8* 31.1* 35.0*  
 230 288 Recent Labs  
  03/30/19 
0300 03/29/19 
0352 03/28/19 
1520 * 141 143  
K 3.8 4.0 4.0  
* 113* 112* CO2 20* 20* 21 * 154* 170* BUN 35* 34* 30* CREA 1.16 1.43* 1.58* CA 8.5 7.6* 7.7* MG 2.5 2.1 2.0 PHOS 3.1 3.5 4.7 ALB 2.7* 2.5* 2.5* SGOT 27 18 14* ALT 19 8* 6* No results for input(s): PH, PCO2, PO2, HCO3, FIO2 in the last 72 hours. No results for input(s): FIO2I, IFO2, HCO3I, IHCO3, HCOPOC, PCO2I, PCOPOC, IPHI, PHI, PHPOC, PO2I, PO2POC in the last 72 hours. No lab exists for component: IPOC2 Imaging: 
[x]   I have personally reviewed the patients radiographs and reports XR Results (most recent): 
Results from Hospital Encounter encounter on 03/28/19 XR CHEST PORT Narrative Portable CXR: 
 
Time stamped: 1440 hours COMPARISON: 1059 hours HISTORY: Central line placement. Left IJ catheter crosses the midline, tip at the proximal SVC entrance. No 
pneumothorax. Increasing density in the right medial lung base. Cardiomegaly with ectatic 
aorta as before. No vascular congestion. No pleural effusion. Impression IMPRESSION: Left central catheter tip overlying SVC entrance. No pneumothorax. More focal infiltrate/atelectasis in the right medial lung base. No CHF. CT Results (most recent): No results found for this or any previous visit. IMPRESSION:  
· Shock- distributive +/- hypovolemic resolved, off vasoactive agents · Acute on chronic encephalopathy- likely metabolic in setting of advanced dementia · Systemic inflammatory response with leukocytosis, tachycardia, elevated initial lactate · Acute kidney injury - Improving · Abnormal UA with large LE, pyuria, bacteriuria · Mildly reduced EF 50-55% on echo (2016) · Dementia -baseline mental status: few intermittent words, bed-bound · Nursing home patient Patient Active Problem List  
Diagnosis Code  Hypotension, unspecified I95.9  Dehydration E86.0  Alzheimer's dementia G30.9, F02.80  
 COPD (chronic obstructive pulmonary disease) ([facility]) J44.9  
 HTN (hypertension) I10  
 Septic shock ([facility]) A41.9, R65.21  Shock ([address] R57.9 RECOMMENDATIONS:  
· Neuro: appears to be back to baseline. · Pulm: stable on room air. May have oxygen supplementation as needed, target SpO2 > 90% · CVS: gradually improving hemodynamics target MAP at least 65 mmHg. wean stress dose IV steroid. No escalation of care · GI: SLP eval; strict NPO. No tube feeding per Luis · Renal: renal ultrasound today. Follow renal indices · Hem/Onc: stable H/H, platelets · I/D: follow blood and urine c/s. Urine Strep and Legionella negative. Continue zosyn; d/c vancomycin · Endocrine: frequently glucose checks while NPO. Avoid hypoglycemia · Musc/Skin: stable, no concerns · Code Status: DNR/ DNI · Consider hospice if aligned with patient and his wife's [address] Best practice : 
Glycemic control DVT PPx Abx High complexity decision making was performed during this consultation and evaluation. [x]       Pt is at high risk for further organ failure and dysfunction. Critical care time spent: 35  minutes with patient exclusive of procedures.  
 
 
Zachary Pena MD

## 2019-03-31 NOTE — PROGRESS NOTES
PT orders received and chart reviewed. Patient is total A and dependent/bed bound at baseline, living in nursing facility. No skilled PT acute care needs, will sign off.  Thank you for this referral.  
 
Suzanna Newsome PT DPT

## 2019-03-31 NOTE — PROGRESS NOTES
OT orders received and chart reviewed. Talked with RN. Patient is total A and dependent/bed bound at baseline, living in nursing facility. No skilled OT acute care needs, will sign off. Thank you.  
 
Delaney Rizo OTR/L

## 2019-03-31 NOTE — ROUTINE PROCESS
Bedside and Verbal shift change report given to Rowena Hawkins RN (oncoming nurse) by Jim Chavez RN (offgoing nurse). Report included the following information SBAR, Kardex, MAR and Recent Results. SITUATION:  
? Code Status: DNR 
? Reason for Admission: Septic shock ([facility] [A41.9, R65.21] ? Shock ([facility] [R57.9] ? Hospital day: 3 
? Problem List:  
   
Hospital Problems  Date Reviewed: 11/30/2016 Codes Class Noted POA Septic shock (HCC) ICD-10-CM: A41.9, R65.21 ICD-9-CM: 038.9, 785.52, 995.92  3/28/2019 Unknown Shock ([facility] ICD-10-CM: R57.9 ICD-9-CM: 785.50  3/28/2019 Unknown BACKGROUND:  
 Past Medical History:  
Past Medical History:  
Diagnosis Date  Bowel obstruction ([facility]  Cancer [facility])   
 colon  Chronic obstructive pulmonary disease ([facility]  Dementia  Hyperlipidemia  Hypertension  Muscle weakness  Parkinson's disease ([facility] Patient taking anticoagulants yes ASSESSMENT:  
? Changes in Assessment Throughout Shift: none ? Patient has Central Line: no Reasons if yes:  
? Patient has Finley Cath: yes Reasons if yes: retention ? Last Vitals: 
  
Vitals:  
 03/30/19 1800 03/30/19 2108 03/30/19 2204 03/31/19 0112 BP:   96/63 Pulse:   71 Resp:   20 Temp:   97.5 °F (36.4 °C) SpO2: 90% 96% 97% 95% Weight:      
Height:      
 
 
? IV and DRAINS (will only show if present) Peripheral IV 03/28/19 Left Forearm-Site Assessment: Clean, dry, & intact [REMOVED] Venous Access Device 03/28/19 Other (comment)-Site Assessment: Clean, dry, & intact [REMOVED] Peripheral IV 03/28/19 Right Wrist-Site Assessment: Clean, dry, & intact [REMOVED] Peripheral IV 03/28/19 Left Antecubital-Site Assessment: Clean, dry, & intact [REMOVED] Triple Lumen 03/28/19 Left Neck-Site Assessment: Clean, dry, & intact Peripheral IV 03/30/19 Left Hand-Site Assessment: Clean, dry, & intact ? WOUND (if present) Wound Type:  none Dressing present no Wound Concerns/Notes:  none ? PAIN Pain Assessment Pain Intensity 1: 0 (03/30/19 2204) Patient Stated Pain Goal: Unable to verbalize/indicate pain 
o Interventions for Pain:  none 
o Intervention effective:  
o Time of last intervention:   
o Reassessment Completed: yes ? Last 3 Weights: 
Last 3 Recorded Weights in this Encounter 03/28/19 1103 03/28/19 1130 Weight: 81.6 kg (180 lb) 81.6 kg (180 lb) Weight change: ? INTAKE/OUPUT Current Shift: 03/30 1901 - 03/31 0700 In: 232.5 [I.V.:232.5] Out: 350 [Urine:350] Last three shifts: 03/29 0701 - 03/30 1900 In: 3290.2 [I.V.:3290.2] Out: 850 [Urine:850] ? LAB RESULTS Recent Labs  
  03/31/19 
0143 03/30/19 
0300 03/29/19 
0352 WBC 7.4 10.0 10.4 HGB 9.6* 9.6* 10.1* HCT 29.2* 30.8* 31.1*  
 229 230 Recent Labs  
  03/31/19 
0143 03/30/19 
0300 03/29/19 
0352  146* 141  
K 3.0* 3.8 4.0  
* 125* 154* BUN 31* 35* 34* CREA 1.09 1.16 1.43* CA 8.0* 8.5 7.6*  
MG 2.3 2.5 2.1 RECOMMENDATIONS AND DISCHARGE PLANNING 1. Pending tests/procedures/ Plan of Care or Other Needs: none 2. Discharge plan for patient and Needs/Barriers: SNF 3. Estimated Discharge Date: unknown Posted on Whiteboard in Patients Room: no   
 
4. The patient's care plan was reviewed with the oncoming nurse. \"HEALS\" SAFETY CHECK Fall Risk Total Score: 3 Safety Measures: Safety Measures: Bed/Chair-Wheels locked, Bed in low position, Call light within reach, Side rails X 3 A safety check occurred in the patient's room between off going nurse and oncoming nurse listed above. The safety check included the below items Area Items H High Alert Medications ? Verify all high alert medication drips (heparin, PCA, etc.) E Equipment ? Suction is set up for ALL patients (with klausker) ? Red plugs utilized for all equipment (IV pumps, etc.) ? WOWs wiped down at end of shift. ? Room stocked with oxygen, suction, and other unit-specific supplies A Alarms ? Bed alarm is set for fall risk patients ? Ensure chair alarm is in place and activated if patient is up in a chair L Lines ? Check IV for any infiltration ? Finley bag is empty if patient has a Finley ? Tubing and IV bags are labeled Marilee Po Safety ? Room is clean, patient is clean, and equipment is clean. ? Hallways are clear from equipment besides carts. ? Fall bracelet on for fall risk patients ? Ensure room is clear and free of clutter ? Suction is set up for ALL patients (with mio) ? Hallways are clear from equipment besides carts. ? Isolation precautions followed, supplies available outside room, sign posted Moon Griffin RN

## 2019-03-31 NOTE — ROUTINE PROCESS
Bedside and Verbal shift change report given to ROSS Hernandez (oncoming nurse) by Bella Stockton (offgoing nurse). Report included the following information SBAR, Kardex, Intake/Output, MAR and Recent Results.

## 2019-04-01 NOTE — PROGRESS NOTES
NUTRITION Nutrition Screen RECOMMENDATIONS / PLAN:  
 
- Continue to provide nutrition interventions consistent with goals of care. - Continue RD inpatient monitoring and evaluation. NUTRITION INTERVENTIONS & DIAGNOSIS:  
 
[x] Meals/snacks: for comfort Nutrition Diagnosis: Inadequate oral intake related to altered mentation, dementia and dysphagia as evidenced by poor meal intake. ASSESSMENT:  
 
4/1: Pt receiving comfort measures only, with plan for hospice. Does not want a feeding tube. Diet ordered for comfort. 3/29: Admitted from nursing home for sepsis, increasingly lethargic with hx of dementia and dysphagia. SLP consulted and recommend MBS prior to initiating diet- too unstable to tolerate today with plan for follow up Monday. Noted plan for possible transition to hospice versus comfort measures, family does not wish to have feeding tube placed. Average po intake adequate to meet patients estimated nutritional needs:   [] Yes     [x] No   [] Unable to determine at this time Diet: DIET DYSPHAGIA PUREED (NDD1) Food Allergies: NKFA Current Appetite:   [] Good     [] Fair     [x] Poor     [] Other: 
Appetite/meal intake prior to admission:   [] Good     [] Fair     [] Poor     [x] Other: poor meal intake with difficulty tolerating soft solids, improved over the past week PTA once downgraded to pureed diet Feeding Limitations:  [x] Swallowing difficulty: SLP following- MBS planned    [] Chewing difficulty    [x] Other: hx of dysphagia, previously on soft solid diet at nursing home then downgraded to pureed diet a week PTA with improved tolerance per wife Current Meal Intake: Patient Vitals for the past 100 hrs: 
 % Diet Eaten 04/01/19 1157 50 % 04/01/19 0910 50 % 03/31/19 1731 25 % 03/31/19 1224 360 %  
03/31/19 0830 460 % BM: 3/31 Skin Integrity: WDL Edema:   [] No     [x] Yes Pertinent Medications: Reviewed:D5 at 50 mL/hr Recent Labs  
  03/31/19 0143 03/30/19 
0300  146*  
K 3.0* 3.8 * 113* CO2 22 20* * 125* BUN 31* 35* CREA 1.09 1.16  
CA 8.0* 8.5 MG 2.3 2.5 PHOS 1.9* 3.1 ALB  --  2.7* SGOT  --  27 ALT  --  19 Intake/Output Summary (Last 24 hours) at 4/1/2019 1344 Last data filed at 4/1/2019 1157 Gross per 24 hour Intake 1417.5 ml Output 1600 ml Net -182.5 ml Anthropometrics: 
Ht Readings from Last 1 Encounters:  
03/28/19 6' 0.01\" (1.829 m) Last 3 Recorded Weights in this Encounter 03/28/19 1103 03/28/19 1130 Weight: 81.6 kg (180 lb) 81.6 kg (180 lb) Body mass index is 24.41 kg/m². Weight History: wife at bedside reports patient appears to have lost weight over the past year based on observation- unsure of specific amount, previous weight over 200 lb Weight Metrics 3/28/2019 12/1/2016 Weight 180 lb 188 lb 1.6 oz  
BMI 24.41 kg/m2 25.51 kg/m2 Admitting Diagnosis: Septic shock ([facility] [address] [A41.9, R65.21] Shock ([facility] [R57.9] Pertinent PMHx: colon cancer, bowel obstruction, HLD, HTN, COPD, dementia Education Needs:        [x] None identified  [] Identified - Not appropriate at this time  []  Identified and addressed - refer to education log Learning Limitations:   [] None identified  [x] Identified: dementia, minimally responsive Cultural, Restoration & ethnic food preferences:  [x] None identified    [] Identified and addressed ESTIMATED NUTRITION NEEDS:  
 
Calories: 5787-4806 kcal (MSJx1.2-1.4) based on  [x] Actual BW 82 kg     [] IBW Protein: 66-98 gm (0.8-1.2 gm/kg) based on  [x] Actual BW      [] IBW Fluid: 1 mL/kcal 
  
MONITORING & EVALUATION:  
 
Nutrition Goal(s): goals modified 1. Provide nutrition intervention as appropriate with goals of care for the next 5-7 days.  Outcome:  [x] Met/Ongoing    [] Progressing towards goal    [] Not progressing towards goal    [x] New/Initial goal   
 
 Monitoring:   [] Food and beverage intake   [x] Diet order   [x] Nutrition-focused physical findings   [x] Treatment/therapy   [] Weight   [] Enteral nutrition intake Previous Recommendations (for follow-up assessments only):     [x]   Implemented       []   Not Implemented (RD to address)      [] No Longer Appropriate     [] No Recommendation Made Discharge Planning: pending ability to tolerate oral diet and goals of care [x] Participated in care planning, discharge planning, & interdisciplinary rounds as appropriate Aliyah Burden RD, [facility] Pager: [phone]

## 2019-04-01 NOTE — PROGRESS NOTES
Palliative Medicine Consult DR. LAMBERT'S \[facility] [place] [facility]\"": [phone] ([phone]) [facility]: [phone] [facility]: [phone] Time In: 1200 Time Out: 1225 Palliative Medicine Team Hemant Durant NP, Robert Cole, NP, and this LCSW) met with patient and wife Adilson Barger in room. Patient did not speak during the interaction, but opened his eyes briefly and smiled. Wife stated that patient was able to be transferred out of the ICU over the weekend. She also reported of this hospitalization, \"I don't want to do that again. \"  
 
Patient has POST in place: No CPR, comfort measures, NO CPR. He also has DDNR form. DNR/DNI. NP to place hospice consult. D/w attending Dr. Enid Soni. BS Hospice liaison Jenny Mercado to f/u. D/c plan is back to SNF with hospice.

## 2019-04-01 NOTE — PROGRESS NOTES
Per Florencia Marsh ([email or web address]) called [facility] for transport spoke with Blane Mix, patient is scheduled for 5:30pm. Transport to [facility]. Informed Florencia Marsh of transportation arrangements.

## 2019-04-01 NOTE — PROGRESS NOTES
Reason for Admission:  Septic shock ([facility] [A41.9, R65.21] Shock ([facility] [R57.9] RRAT Score:    17 Plan for utilizing home health:    None anticipated Likelihood of Readmission:   Moderate Do you (patient/family) have any concerns for transition/discharge?  no 
 
Transition of Care Plan:    
 
Initial assessment completed with spouse/SO. Cognitive status of patient: disoriented. Face sheet information confirmed:  yes. Erick Malloy, spouse provided information. This patient is longterm resident at  [address], [facility]. Prior to hospitalization, patient was considered to be independent with ADLs/IADLS : no . Pt is complete care. Patient has a current ACP document on file: yes The patient will need medical transport at discharge. This patient is on dialysis :no  
 
 Currently, the discharge plan is  Return to LTC as Hospice Patient's current insurance is Medicare Part A & B and  Care Management Interventions PCP Verified by CM: (unknown) Palliative Care Criteria Met (RRAT>21 & CHF Dx)?: No 
Mode of Transport at Discharge: hospitals Transition of Care Consult (CM Consult): Discharge Planning MyChart Signup: No 
Discharge Durable Medical Equipment: No 
Physical Therapy Consult: No 
Occupational Therapy Consult: No 
Current Support Network: [address] [place] [address] Confirm Follow Up Transport: Other (see comment)(medical transport) Plan discussed with Pt/Family/Caregiver: (ICU treatment team) [employer] Information Provided?: No 
Discharge Location Discharge Placement: Other: 
 
 
VANESSA Bello, RN Pager # [phone] Care Manager

## 2019-04-01 NOTE — DISCHARGE SUMMARY
Discharge Summary Patient: Karoilna Kimble MRN: [id number]  CSN: [id number] YOB: 1931  Age: 80 y.o. Sex: male DOA: 3/28/2019 LOS:  LOS: 4 days   Discharge Date: 4/1/2019 Admission Diagnoses:  
Sepsis with septic shock Discharge Diagnoses:   
Sepsis with septic shock, POA - urinary source suspected Acute metabolic encephalopathy, improved Hypernatremia Hypokalemia MARIUM on CKD 3 Troponin elevation in setting of sepsis/severe infection, ACS ruled out HTN 
COPD with acute exacerbation Anemia BPH Parkinson's disease Advanced dementia Advanced age Discharge Condition: Stable PHYSICAL EXAM 
Visit Vitals /71 (BP 1 Location: Left arm, BP Patient Position: At rest) Pulse 60 Temp 97.8 °F (36.6 °C) Resp 17 Ht 6' 0.01\" (1.829 m) Wt 81.6 kg (180 lb) SpO2 96% BMI 24.41 kg/m² General: In NAD. Nontoxic-appearing. HEENT: NCAT. Sclerae anicteric. Lungs:  Clear, no wheezes. Effort nonlabored. Heart:  RRR Abdomen: Soft, Non distended, Non tender.  +Bowel sounds, no HSM Extremities: No c/c/e Psych:   Good insight. Not anxious or agitated. Neurologic:  Awake. Verbal but minimally so. Able to make short statements. Hospital Course:  
See admission H&P for full details of HPI. Patient admitted initially to ICU for management of sepsis. From the very begninning, there was conversation held with regard to code status and goals of care. Decision has been made by patient's spouse to proceed with hospice care services. He has been approved for hospice and is stable to return to the nursing facility today with these measures in place. Plan has been discussed with spouse and she is agreeable to proceed with plan as outlined. Consults: PCCM Significant Diagnostic Studies:  
Renal US: IMPRESSION: 
  
Asymmetric renal size right smaller than left. Etiology is uncertain. Could be developmental or sequelae from previous infection or obstruction or from chronic 
vascular insufficiency. 
  
No hydronephrosis bilaterally. 
  
No ultrasound finding for renal abscess. 
  
Right upper quadrant ascites. 
  
Small left pleural effusion. CXR: 
IMPRESSION: Left central catheter tip overlying SVC entrance. No pneumothorax. More focal infiltrate/atelectasis in the right medial lung base. No CHF. Modified barium swallow:   
Results not available at time of transcription. Discharge Medications:     
Current Discharge Medication List  
  
START taking these medications Details  
scopolamine (TRANSDERM-SCOP) 1 mg over 3 days pt3d 1 Patch by TransDERmal route every seventy-two (72) hours. Qty: 3 Patch, Refills: 0 LORazepam (INTENSOL) 2 mg/mL concentrated solution Take 0.5 mL by mouth every one (1) hour as needed for Anxiety. Max Daily Amount: 24 mg. Qty: 30 mL, Refills: 0 Associated Diagnoses: Hospice care patient  
  
morphine (ROXANOL) 100 mg/5 mL (20 mg/mL) concentrated solution Take 0.5 mL by mouth every two (2) hours as needed for Pain for up to 30 days. Max Daily Amount: 120 mg. 
Qty: 30 mL, Refills: 0 Associated Diagnoses: Hospice care patient  
  
promethazine (PHENERGAN) 25 mg suppository Insert 1 Suppository into rectum every six (6) hours as needed for Nausea for up to 15 doses. Qty: 15 Suppository, Refills: 0 CONTINUE these medications which have NOT CHANGED Details  
acetaminophen (TYLENOL) 325 mg tablet Take 650 mg by mouth every four (4) hours as needed for Pain.   
  
  
STOP taking these medications  
  
 aspirin-dipyridamole (AGGRENOX)  mg per SR capsule Comments:  
Reason for Stopping:   
   
 carbidopa-levodopa CR (SINEMET CR)  mg per tablet Comments:  
Reason for Stopping:   
   
 tamsulosin (FLOMAX) 0.4 mg capsule Comments:  
Reason for Stopping:   
   
 fluticasone-salmeterol (ADVAIR HFA) 115-21 mcg/actuation inhaler Comments:  
Reason for Stopping:   
   
 midodrine (PROAMITINE) 5 mg tablet Comments:  
Reason for Stopping:   
   
 magnesium hydroxide (MILK OF MAGNESIA) 400 mg/5 mL suspension Comments:  
Reason for Stopping:   
   
 rasagiline (AZILECT) 1 mg tablet Comments:  
Reason for Stopping:   
   
 mirtazapine (REMERON) 15 mg tablet Comments:  
Reason for Stopping:   
   
 rivastigmine (EXELON) 13.3 mg/24 hour patch Comments:  
Reason for Stopping:   
   
 therapeutic multivitamin (THERA) tablet Comments:  
Reason for Stopping:   
   
 triamcinolone (ARISTOCORT) 0.5 % topical cream Comments:  
Reason for Stopping:   
   
 simvastatin (ZOCOR) 40 mg tablet Comments:  
Reason for Stopping:   
   
  
 
 
 
Activity: As tolerated Diet: Comfort feeding/as tolerated Disposition: Nursing facility Faisal Leslie. Douglas Bustos MD 
[facility]

## 2019-04-01 NOTE — PROGRESS NOTES
Discharge planning Discharge order noted for today. Patient has been accepted to [address] [place] [address] LT facility. Confirmed with Amirah that bed is available today. Met with  Darrenelaine Gao, spouse at bedside and are agreeable to the transition plan today. Transport to facility has been arranged through [address] [place] [address],[address] at 5:30 p.m. Shira Machado Patient's discharge summary has been forwarded to skilled nursing facility via cc/Link. Bedside RN, Remington Abrams, has been updated to the transition plan. Discharge information has been updated on the AVS.  Please call report to [phone]. Clemente Roth, RN, hospice was notified of transport time. EMILE KrishnanN, RN Pager # [phone] Care Manager

## 2019-04-01 NOTE — PROGRESS NOTES
Palliative Medicine Consult DR. LAMBERT'S hospitals: [phone] ([phone]) [facility]: [phone] [facility]/[address]: [phone] Patient Name: Fauzia Levy YOB: 1931 Date of Initial Consult: 3/29/19 Reason for Consult: care decisions Requesting Provider: Dagmar Salter Primary Care Physician: Alvin Babb MD 
  
 SUMMARY:  
Fauzia Levy is a 80y.o. year old with a past history of Alzheimer's dementia, COPD, HTN, who was admitted on 3/28/2019 from [facility] with a diagnosis of sepsis. Current medical issues leading to Palliative Medicine involvement include: 81 y/o nursing home resident with advanced dementia admitted to the ICU for sepsis. He is known to our service and we are consulted to discuss goals of care. 4/1/19: Pt seen at bedside along with Daly BARCLAY and Kristi Correia NP. He is out of ICU and more alert, able to answer simple questions with short phrases. PALLIATIVE DIAGNOSES:  
1. ACP/goals of care 2. Sepsis 3. Dementia 4. debility PLAN:  
4/1/19: Pt seen at bedside. He is alert and said \"I'm ok\". His wife later joined. We discussed the plan moving forward. She says she would like the patient to be discharged back to [facility]  with hospice services. Hospice consult placed and plan discussed with Dr. Jeri Evans. Until discharge continue current care, DNR/DNI, no escalation of care. ACP/goals of care: Met with patient and wife at bedside along with Kristi Correia NP. The patient is known to me from [facility]. He is intermittently alert, but not able to participate in conversation. His wife Amrita Garcia is his medical decision maker and previously signed a POST for DNR, comfort measures, no feeding tube.  His wife describes that the patient was not acting like himself and was brought to the hospital. He is currently on 2 pressors for hypotension r/t sepsis. She asks us about hospice. She says she would like the patient to start hospice services once he returns to the facility. We discussed starting comfort measures her in the hospital, however ultimately the wife decided to continue current treatment including pressors, but no escalation of care. She does not want the patient to have CPR, no intubation for any reason, and says that if the patient's hypotension were to worsen and require a third pressor to instead initiate comfort measures at that time. We will continue treatment to see if the patient improves over the weekend. The plan for now is return to the facility with hospice services in place once able. 1. Sepsis: likely urinary source, on pressors fo hypotension, antibiotics 2. Dementia: advanced, pt says only a few words, is bedbound 3. Debility: bedbound nursing home patient, if he survives hospitalization his wife wishes to start hospice upon return to the facility 4. Initial consult note routed to primary continuity provider 5. Communicated plan of care with: Palliative IDT 
 
GOALS OF CARE: 
Patient/Health Care Proxy Stated Goals: Comfort TREATMENT PREFERENCES:  
Code Status: DNR Advance Care Planning: 
[x] [facility] Interdisciplinary Team has updated the ACP Navigator with [address] and Patient Capacity Primary Decision Maker (Health Care Agent): Hussain Notice (wife) Medical Interventions: Limited additional interventions Other Instructions: do not intubate, do not add a third pressor, DNR Artificially Administered Nutrition: No feeding tube Other: As far as possible, the palliative care team has discussed with patient / health care proxy about goals of care / treatment preferences for patient. HISTORY:  
 
History obtained from: chart, wife CHIEF COMPLAINT: AMS 
 
HPI/SUBJECTIVE: The patient is:  
[] Verbal and participatory [x] Non-participatory due to:  
Advanced dementia Clinical Pain Assessment (nonverbal scale for nonverbal patients): Clinical Pain Assessment Severity: 0 Activity (Movement): Lying quietly, normal position Duration: for how long has pt been experiencing pain (e.g., 2 days, 1 month, years) Frequency: how often pain is an issue (e.g., several times per day, once every few days, constant) FUNCTIONAL ASSESSMENT:  
 
Palliative Performance Scale (PPS): PPS: 30 
 
ECOG 
ECOG Status : Completely disabled PSYCHOSOCIAL/SPIRITUAL SCREENING:  
  
Any spiritual / Mosque concerns: 
[] Yes /  [x] No 
 
Caregiver Burnout: 
[] Yes /  [x] No /  [] No Caregiver Present Anticipatory grief assessment:  
[x] Normal  / [] Maladaptive REVIEW OF SYSTEMS:  
 
Positive and pertinent negative findings in ROS are noted above in HPI. The following systems were [] reviewed / [x] unable to be reviewed as noted in HPI Other findings are noted below. Systems: constitutional, ears/nose/mouth/throat, respiratory, gastrointestinal, genitourinary, musculoskeletal, integumentary, neurologic, psychiatric, endocrine. Positive findings noted below. Modified ESAS Completed by: provider Drowsiness: 5 Pain: 0 Dyspnea: 0 Stool Occurrence(s): 0 PHYSICAL EXAM:  
 
Wt Readings from Last 3 Encounters:  
03/28/19 81.6 kg (180 lb) 12/01/16 85.3 kg (188 lb 1.6 oz) Blood pressure 109/71, pulse 60, temperature 97.8 °F (36.6 °C), resp. rate 17, height 6' 0.01\" (1.829 m), weight 81.6 kg (180 lb), SpO2 96 %. Pain: 
Pain Scale 1: Adult Nonverbal Pain Scale Pain Intensity 1: 0 Last bowel movement: none recorded Constitutional: elderly white man in bed, alert at times, says short phrases Eyes: pupils equal, anicteric ENMT: no nasal discharge, moist mucous membranes Respiratory: breathing not labored, symmetric Musculoskeletal: no deformity, no tenderness to palpation Skin: warm, dry Neurologic: not following commands Psychiatric: full affect, no hallucinations Other: 
 
 
 HISTORY:  
 
Active Problems: 
  Septic shock ([facility] (3/28/2019) Shock ([facility] (3/28/2019) Past Medical History:  
Diagnosis Date  [facility]  Cancer [facility])   
 colon  Chronic obstructive pulmonary disease ([facility]  Dementia  Hyperlipidemia  Hypertension  Muscle weakness  Parkinson's disease ([facility] Past Surgical History:  
Procedure Laterality Date  HX GI    
 colon cancer  HX HEENT    
 left eye cataract History reviewed. No pertinent family history. History reviewed, no pertinent family history. Social History Tobacco Use  Smoking status: Unknown If Ever Smoked  Smokeless tobacco: Never Used Substance Use Topics  Alcohol use: Not Currently No Known Allergies Current Facility-Administered Medications Medication Dose Route Frequency  albuterol-ipratropium (DUO-NEB) 2.5 MG-0.5 MG/3 ML  3 mL Nebulization Q6H PRN  
 dextrose 5% infusion  50 mL/hr IntraVENous CONTINUOUS  
 [START ON 4/2/2019] hydrocortisone Sod Succ (PF) (SOLU-CORTEF) injection 50 mg  50 mg IntraVENous ONCE  
 budesonide (PULMICORT) 500 mcg/2 ml nebulizer suspension  500 mcg Nebulization BID RT  
 piperacillin-tazobactam (ZOSYN) 4.5 g in 0.9% sodium chloride (MBP/ADV) 100 mL MBP  4.5 g IntraVENous Q6H  
 sodium chloride (NS) flush 5-40 mL  5-40 mL IntraVENous Q8H  
 sodium chloride (NS) flush 5-40 mL  5-40 mL IntraVENous PRN  
 glucose chewable tablet 16 g  4 Tab Oral PRN  
 glucagon (GLUCAGEN) injection 1 mg  1 mg IntraMUSCular PRN  
 dextrose (D50W) injection syrg 12.5-25 g  25-50 mL IntraVENous PRN  
 heparin (porcine) injection 5,000 Units  5,000 Units SubCUTAneous Q8H  
 
 
 LAB AND IMAGING FINDINGS:  
 
Lab Results Component Value Date/Time WBC 7.4 03/31/2019 01:43 AM  
 HGB 9.6 (L) 03/31/2019 01:43 AM  
 PLATELET 526 40/49/5070 01:43 AM  
 
Lab Results Component Value Date/Time Sodium 143 03/31/2019 01:43 AM  
 Potassium 3.0 (L) 03/31/2019 01:43 AM  
 Chloride 110 (H) 03/31/2019 01:43 AM  
 CO2 22 03/31/2019 01:43 AM  
 BUN 31 (H) 03/31/2019 01:43 AM  
 Creatinine 1.09 03/31/2019 01:43 AM  
 Calcium 8.0 (L) 03/31/2019 01:43 AM  
 Magnesium 2.3 03/31/2019 01:43 AM  
 Phosphorus 1.9 (L) 03/31/2019 01:43 AM  
  
Lab Results Component Value Date/Time AST (SGOT) 27 03/30/2019 03:00 AM  
 Alk. phosphatase 69 03/30/2019 03:00 AM  
 Protein, total 6.8 03/30/2019 03:00 AM  
 Albumin 2.7 (L) 03/30/2019 03:00 AM  
 Globulin 4.1 (H) 03/30/2019 03:00 AM  
 
No results found for: INR, PTMR, PTP, PT1, PT2, APTT No results found for: IRON, FE, TIBC, IBCT, PSAT, FERR No results found for: PH, PCO2, PO2 No components found for: [place] Lab Results Component Value Date/Time CK 77 03/31/2019 01:43 AM  
 CK - MB 1.5 03/31/2019 01:43 AM  
  
 
   
 
Total time: 15 
Counseling / coordination time, spent as noted above: 10 
> 50% counseling / coordination: yes with wife,  provider Prolonged service was provided for  []30 min   []75 min in face to face time in the presence of the patient, spent as noted above. Time Start:  
Time End:  
Note: this can only be billed with 25459 (initial) or 98404 (follow up). If multiple start / stop times, list each separately.

## 2019-04-01 NOTE — ROUTINE PROCESS
Bedside and Verbal shift change report given to [facility], RN (oncoming nurse) by Moon Griffin RN (offgoing nurse). Report included the following information SBAR, Kardex, MAR and Recent Results. SITUATION:  
? Code Status: DNR Reason for Admission: Septic shock ([facility] [address] [A41.9, R65.21] ? Shock ([facility] [R57.9] ? Hospital day: 4 
? Problem List:  
   
Hospital Problems  Date Reviewed: 11/30/2016 Codes Class Noted POA Septic shock (HCC) ICD-10-CM: A41.9, R65.21 ICD-9-CM: 038.9, 785.52, 995.92  3/28/2019 Unknown Shock ([facility] ICD-10-CM: R57.9 ICD-9-CM: 785.50  3/28/2019 Unknown BACKGROUND:  
 Past Medical History:  
Past Medical History:  
Diagnosis Date  Bowel obstruction ([facility]  Cancer [facility])   
 colon  Chronic obstructive pulmonary disease ([facility]  Dementia  Hyperlipidemia  Hypertension  Muscle weakness  Parkinson's disease ([facility] Patient taking anticoagulants yes ASSESSMENT:  
? Changes in Assessment Throughout Shift: none ? Patient has Central Line: no Reasons if yes:  
? Patient has Finley Cath: yes Reasons if yes: retention ? Last Vitals: 
  
Vitals:  
 03/31/19 7056 03/31/19 2134 03/31/19 2155 04/01/19 4903 BP: 109/72  113/79 118/67 Pulse: 72  68 (!) 50 Resp: 19  18 20 Temp: 98.1 °F (36.7 °C)  98 °F (36.7 °C) 97.6 °F (36.4 °C) SpO2: 97% 99% 99% 96% Weight:      
Height:      
 
 
? IV and DRAINS (will only show if present) Peripheral IV 03/28/19 Left Forearm-Site Assessment: Clean, dry, & intact [REMOVED] Venous Access Device 03/28/19 Other (comment)-Site Assessment: Clean, dry, & intact [REMOVED] Peripheral IV 03/28/19 Right Wrist-Site Assessment: Clean, dry, & intact [REMOVED] Peripheral IV 03/28/19 Left Antecubital-Site Assessment: Clean, dry, & intact [REMOVED] Triple Lumen 03/28/19 Left Neck-Site Assessment: Clean, dry, & intact Peripheral IV 03/30/19 Left Hand-Site Assessment: Clean, dry, & intact ? WOUND (if present) Wound Type:  none Dressing present no Wound Concerns/Notes:  none ? PAIN Pain Assessment Pain Intensity 1: 0 (04/01/19 0105) Patient Stated Pain Goal: Unable to verbalize/indicate pain 
o Interventions for Pain:  none 
o Intervention effective:  
o Time of last intervention:   
o Reassessment Completed: yes ? Last 3 Weights: 
Last 3 Recorded Weights in this Encounter 03/28/19 1103 03/28/19 1130 Weight: 81.6 kg (180 lb) 81.6 kg (180 lb) Weight change: ? INTAKE/OUPUT Current Shift: 03/31 1901 - 04/01 0700 In: 287.5 [I.V.:287.5] Out: 400 [Urine:400] Last three shifts: 03/30 0701 - 03/31 1900 In: 1512.5 [P.O.:295; I.V.:1217.5] Out: 1450 [FGBLZ:0269] ? LAB RESULTS Recent Labs  
  03/31/19 
0143 03/30/19 
0300 WBC 7.4 10.0 HGB 9.6* 9.6* HCT 29.2* 30.8*  229 Recent Labs  
  03/31/19 
0143 03/30/19 
0300  146*  
K 3.0* 3.8 * 125* BUN 31* 35* CREA 1.09 1.16  
CA 8.0* 8.5 MG 2.3 2.5 RECOMMENDATIONS AND DISCHARGE PLANNING 1. Pending tests/procedures/ Plan of Care or Other Needs: none 2. Discharge plan for patient and Needs/Barriers: SNF 3. Estimated Discharge Date: unknown Posted on Whiteboard in Patients Room: no   
 
4. The patient's care plan was reviewed with the oncoming nurse. \"HEALS\" SAFETY CHECK Fall Risk Total Score: 3 Safety Measures: Safety Measures: Bed/Chair alarm on, Bed/Chair-Wheels locked, Bed in low position, Call light within reach A safety check occurred in the patient's room between off going nurse and oncoming nurse listed above. The safety check included the below items Area Items H High Alert Medications ? Verify all high alert medication drips (heparin, PCA, etc.) E Equipment ? Suction is set up for ALL patients (with yanker) ? Red plugs utilized for all equipment (IV pumps, etc.) ? WOWs wiped down at end of shift. ? Room stocked with oxygen, suction, and other unit-specific supplies A Alarms ? Bed alarm is set for fall risk patients ? Ensure chair alarm is in place and activated if patient is up in a chair L Lines ? Check IV for any infiltration ? Finley bag is empty if patient has a Finley ? Tubing and IV bags are labeled Eugene Guzman Safety ? Room is clean, patient is clean, and equipment is clean. ? Hallways are clear from equipment besides carts. ? Fall bracelet on for fall risk patients ? Ensure room is clear and free of clutter ? Suction is set up for ALL patients (with mio) ? Hallways are clear from equipment besides carts. ? Isolation precautions followed, supplies available outside room, sign posted Yasmani Arias RN

## 2019-04-01 NOTE — HOSPICE
Met with patient and wife at bedside. Discussed [facility] philosophy,services, criteria, and IDT. Answered all questions. Gave brochure with 24/7 contact information. Wife agreeable to hospice services at [facility]. Discussed with Moe Hurtado who is calling [facility] to confirm patients return. Thank you for the referral to [facility]. If we can be of further assistance please contact [phone]. EMILE GautamN, RN 
Nurse Liaison, [address] [facility] [place], [address] 
[phone] 
[email or web address]

## 2019-04-01 NOTE — ROUTINE PROCESS
Bedside and Verbal shift change report given by Goran Shah RN (offgoing nurse) to Davina Nelson RN (oncoming nurse).  Report included the following information SBAR, Kardex and [facility]

## 2019-04-01 NOTE — PROGRESS NOTES
Problem: Dysphagia (Adult) Goal: *Acute Goals and Plan of Care (Insert Text) Description Patient will: 1. Tolerate PO trials with 0 s/s overt distress in 4/5 trials 2. Utilize compensatory swallow strategies/maneuvers (decrease bite/sip, size/rate, alt. liq/sol) with min cues in 4/5 trials 3. Perform oral-motor/laryngeal exercises to increase oropharyngeal swallow function with min cues 4. Complete an objective swallow study (i.e., MBSS) to assess swallow integrity, r/o aspiration, and determine of safest LRD, min A- met 4/1/19 Rec:    
Puree with nectar thick liquids Aspiration precautions HOB >45 during po intake, remain >30 for 30-45 minutes after po Small bites/sips; alternate liquid/solid with slow feeding rate Oral care TID Meds crushed in puree May benefit from assistance with PO Outcome: Not Progressing Towards Goal 
 SPEECH PATHOLOGY MODIFIED BARIUM SWALLOW STUDY/TREATMENT Patient: Fauzia Levy (26 y.o. male) Date: 4/1/2019 Primary Diagnosis: Septic shock ([facility] [address] [A41.9, R65.21] Shock ([facility] [address] [R57.9] Precautions: Aspiration ASSESSMENT : 
Based on the objective data described below, the patient presents with mod oral dysphagia and mod-sev pharyngeal dysphagia c/b silent aspiration of thin liquids. Student nurse accompanied pt for evaluation. Pt was alert and able to follow all commands throughout study. Pt tolerated nectar thick barium, puree, and solid with no evidence of aspiration/penetration. Pt with delayed swallow initiation and premature spillage to valleculae with nectar thick, however, demo adequate laryngeal elevation/closure during the swallow with mod vallecular residue noted. Pt with increased mastication of solid, but demo timely swallow and positive laryngeal vestibule closure. Pt demo silent aspiration of thin liquids +/-straw, required verbal prompt by SLP to cough.  Deficits include decreased tongue based retraction, laryngeal elevation/closure, slightly decreased pharyngeal motility/sensation, and overall decreased pharyngeal strength. Rec puree diet with nectar thick liquids, meds crushed in puree, aspiration precautions, and oral care TID, however, rec further diet liberalization following transition to hospice. Discussed with pt and student nurse. TREATMENT: 
Skilled treatment initiated; pt educated on results of MBS, new diets recs, and aspiration precautions (small sips/bites, alt solids/liquids, HOB <45 degrees during all Po), verbalized understanding. Patient will benefit from skilled intervention to address the above impairments. Patient?s rehabilitation potential is considered to be Fair Factors which may influence rehabilitation potential include:  
? None noted ? Mental ability/status ? Medical condition ? Home/family situation and support systems ? Safety awareness ? Pain tolerance/management ? Other: PLAN : 
Recommendations and Planned Interventions: See above Frequency/Duration: Patient will be followed by speech-language pathology 1-2 times per day/3-5 days per week to address goals. Discharge Recommendations: To Be Determined SUBJECTIVE:  
Patient stated ? Okay? . 
 
OBJECTIVE:  
 
Past Medical History:  
Diagnosis Date Bowel obstruction ([facility] [address] Cancer [facility])   
 colon Chronic obstructive pulmonary disease (HCC) Dementia Hyperlipidemia Hypertension Muscle weakness Parkinson's disease ([facility] [address] Past Surgical History:  
Procedure Laterality Date HX GI    
 colon cancer HX HEENT    
 left eye cataract Prior Level of Function/Home Situation: As per H&P Home Situation Home Environment: Skilled nursing facility One/Two Story Residence: Other (Comment) Living Alone: No 
Support Systems: Spouse/Significant Other/Partner Patient Expects to be Discharged to[de-identified] Skilled nursing facility Current DME Used/Available at Home: Other (comment)(unknown) Diet prior to admission: unknown Current Diet:  puree with nectar thick liquids Radiologist:   
  
Patient Position: 90 in fluoro chair Trial 1: Trial 2:  
Consistency Presented: Nectar thick liquid;Puree; Solid Consistency Presented: Thin liquid How Presented: SLP-fed/presented;Straw;Cup/sip How Presented: SLP-fed/presented;Cup/sip;Straw Bolus Acceptance: No impairment Bolus Acceptance: No impairment Bolus Formation/Control: Impaired: Mastication Bolus Formation/Control: No impairment:    
Propulsion: No impairment Propulsion: No impairment Oral Residue: None Oral Residue: None Initiation of Swallow: Triggered at pyriform sinus(es) Initiation of Swallow: Triggered at valleculae Timing: Pooling 1-5 sec Timing: No impairment Penetration: None Penetration: None Aspiration/Timing: No evidence of aspiration Aspiration/Timing: Silent ;During Pharyngeal Clearance: Vallecular residue;10-50% Pharyngeal Clearance: No residue Attempted Modifications: Small sips and bites Attempted Modifications: Small sips and bites Effective Modifications: Small sips and bites Effective Modifications: None Cues for Modifications: None Cues for Modifications: None Decreased Tongue Base Retraction?: Yes Laryngeal Elevation: Incomplete laryngeal closure; Inadequate epiglottic inversion Aspiration/Penetration Score: 8 (Aspiration-Contrast passes cords/glottis with no effort to eject, ie/silent aspiration) Pharyngeal Symmetry: Not assessed Pharyngeal-Esophageal Segment: No impairment Pharyngeal Dysfunction: Decreased strength;Decreased elevation/closure;Decreased tongue base retraction Oral Phase Severity: Moderate Pharyngeal Phase Severity: Moderately severe The severity rating is based on the following outcomes:   
8-point Penetration-Aspiration Scale: Score 8 Professional Judgment PAIN: 
Pt reports 0/10 pain or discomfort prior to MBS. Pt reports 0/10 pain or discomfort post MBS. COMMUNICATION/EDUCATION:  
?  Patient educated regarding MBS results and diet recommendations. ?  Patient/family have participated as able in goal setting and plan of care. ?  Patient/family agree to work toward stated goals and plan of care. ?  Patient understands intent and goals of therapy, but is neutral about his/her participation. ? Patient is unable to participate in goal setting and plan of care. Thank you for this referral. 
Mendel Listen, SLP intern

## 2019-06-18 ENCOUNTER — HOME CARE VISIT (OUTPATIENT)
Dept: HOSPICE | Facility: HOSPICE | Age: 84
End: 2019-06-18
Payer: MEDICARE

## 2024-06-03 NOTE — PROGRESS NOTES
Hospitalist Progress NotePatient: Kobi Jackson MRN: [id number]  CSN: [id number] YOB: 1931  Age: 80 y.o. Sex: male DOA: 3/28/2019 LOS:  LOS: 3 days Assessment/Plan Active Problems: 
  Septic shock ([address] (3/28/2019) Shock ([address] (3/28/2019) Interval History He presented from nursing home after he was found lethargic and hypotensive. At baseline, he remained bedbound with underlying dementia. In the ER, he was found to be in septic shock from unclear infection source. He was admitted to ICU where he required pressor support. He was also found to have MARIUM with slight increase in troponin which have been corrected. He has been stabilized and off pressor. Infectious source is unclear but he has been treated for suspected UTI. He has been on stress dose steroid. He has been NPO due to concern for aspiration. Speech therapy to follow up. In the nursing, his wife stated that he take pureed food.   
  
 
 
 
Plan -  
 
1. Acute encephalopathy with underlying Alzheimer's dementia, likely metabolic and infectious, resolving to baseline. 2.  Sepsis with shock, (SIRS with UTI on POA), resolved 3. MARIUM on CKD III, likely due to sepsis 4. Elevated troponin in the setting of sepsis 5. HTN 
6. COPD, stable 7. Parkinson's disease with baseline bedbound 8. BPH  
9. Dysphagia with risk of aspiration 10. Normocytic anemia 11. Mild hypernatremia He will continue on IV antibiotic for now, will follow up on blood culture and urine culture for completeness. On tapering steroid dosing, but at home, he is also on Midodrine, will add if needed. Continues on IV fluid for now, await for speech therapy to see if he can sustain oral intake Re-check BMP and cardiac enzyme, but will hold off further workup as Wife if still deciding on care with palliative Will need ICS and bronchodilator prn, needs electrolyte replacement. [Contraception/ Emergency Contraception/ Safe Sexual Practices] : contraception, emergency contraception, safe sexual practices Will need to resume parkinson medications Will transition back to LTC when medically stable. Continue above Rx Plan for now Will follow , discussed with wife at bedside  
  
 
 
 
 
Case discussed with:  []Patient  [x]Family  []Nursing  []Case Management DVT Prophylaxis:  []Lovenox  [x]Hep SQ  []SCDs  []Coumadin   []On Heparin gtt Subjective:  
 
CC: No new events overnight , pt is lying in bed , difficult to understand , wife at bedside Objective:  
  
Visit Vitals /71 (BP 1 Location: Left arm, BP Patient Position: At rest) Pulse 65 Temp 98.1 °F (36.7 °C) Resp 19 Ht 6' 0.01\" (1.829 m) Wt 81.6 kg (180 lb) SpO2 99% BMI 24.41 kg/m² Physical Exam: 
 
Gen: In general, this is a poorly nourished male in no acute distress HEENT: Sclerae nonicteric. Oral mucous membranes moist. Dentition poor Neck: Supple with midline trachea. CV: RRR without murmur or rub appreciated. Resp:Respirations are unlabored without use of accessory muscles. Lung fields bilaterally without wheezes or rhonchi. Abd: Soft, nontender, nondistended. Extrem: Extremities are warm, without cyanosis or clubbing. No pitting pretibial edema. Skin: Warm, no visible rashes. Neuro: Patient is alert to self Intake and Output: 
Current Shift:  03/31 0701 - 03/31 1900 In: 100 [P.O.:100] Out: 200 [Urine:200] Last three shifts:  03/29 1901 - 03/31 0700 In: 2175.5 [I.V.:2175.5] Out: 650 [Urine:650] Recent Results (from the past 24 hour(s)) GLUCOSE, POC Collection Time: 03/30/19  5:19 PM  
Result Value Ref Range Glucose (POC) 169 (H) 70 - 110 mg/dL MAGNESIUM Collection Time: 03/31/19  1:43 AM  
Result Value Ref Range Magnesium 2.3 1.6 - 2.6 mg/dL PHOSPHORUS Collection Time: 03/31/19  1:43 AM  
Result Value Ref Range Phosphorus 1.9 (L) 2.5 - 4.9 MG/DL  
METABOLIC PANEL, BASIC Collection Time: 03/31/19  1:43 AM  
Result Value Ref Range  Sodium 143 136 - 145 mmol/L  
 [STD (testing, results, tx)] : STD (testing, results, tx) Potassium 3.0 (L) 3.5 - 5.5 mmol/L Chloride 110 (H) 100 - 108 mmol/L  
 CO2 22 21 - 32 mmol/L Anion gap 11 3.0 - 18 mmol/L Glucose 154 (H) 74 - 99 mg/dL BUN 31 (H) 7.0 - 18 MG/DL Creatinine 1.09 0.6 - 1.3 MG/DL  
 BUN/Creatinine ratio 28 (H) 12 - 20 GFR est AA >60 >60 ml/min/1.73m2 GFR est non-AA >60 >60 ml/min/1.73m2 Calcium 8.0 (L) 8.5 - 10.1 MG/DL  
CBC W/O DIFF Collection Time: 03/31/19  1:43 AM  
Result Value Ref Range WBC 7.4 4.6 - 13.2 K/uL  
 RBC 3.16 (L) 4.70 - 5.50 M/uL HGB 9.6 (L) 13.0 - 16.0 g/dL HCT 29.2 (L) 36.0 - 48.0 % MCV 92.4 74.0 - 97.0 FL  
 MCH 30.4 24.0 - 34.0 PG  
 MCHC 32.9 31.0 - 37.0 g/dL  
 RDW 13.7 11.6 - 14.5 % PLATELET 427 857 - 247 K/uL MPV 10.7 9.2 - 11.8 FL  
CARDIAC PANEL,(CK, CKMB & TROPONIN) Collection Time: 03/31/19  1:43 AM  
Result Value Ref Range CK 77 39 - 308 U/L  
 CK - MB 1.5 <3.6 ng/ml CK-MB Index 1.9 0.0 - 4.0 % Troponin-I, QT 0.23 (H) 0.0 - 0.045 NG/ML  
NT-PRO BNP Collection Time: 03/31/19  1:43 AM  
Result Value Ref Range NT pro-BNP 11,008 (H) 0 - 1,800 PG/ML Current Meds - Reviewed Lab Results Component Value Date/Time  Glucose 154 (H) 03/31/2019 01:43 AM  
 Glucose 125 (H) 03/30/2019 03:00 AM  
 Glucose 154 (H) 03/29/2019 03:52 AM  
 Glucose 170 (H) 03/28/2019 03:20 PM  
 Glucose 134 (H) 03/28/2019 11:00 AM  
  
 
 
 
Clementine Sicard, MD 
3/31/2019, 12:54 PM